# Patient Record
Sex: FEMALE | Race: WHITE | NOT HISPANIC OR LATINO | Employment: UNEMPLOYED | URBAN - METROPOLITAN AREA
[De-identification: names, ages, dates, MRNs, and addresses within clinical notes are randomized per-mention and may not be internally consistent; named-entity substitution may affect disease eponyms.]

---

## 2017-02-07 ENCOUNTER — TELEPHONE (OUTPATIENT)
Dept: ENDOCRINOLOGY | Facility: CLINIC | Age: 42
End: 2017-02-07

## 2017-02-07 NOTE — TELEPHONE ENCOUNTER
----- Message from Mar Watkins MD sent at 2/6/2017  4:14 PM EST -----  When can she come in.    We are very full and will have to work her in.  If she cannot come in soon she will need to see her family MD for insulin refills.  Thanks, [facility]  ----- Message -----     From: Geeta Anderson     Sent: 2/6/2017   8:32 AM       To: Mar Watkins MD    Do you want to work back in?  ----- Message -----     From: Hilaria Sahni     Sent: 2/1/2017   3:21 PM       To: Geeta Anderson    THE PATIENT CANCELLED HER APPOINTMENT WITH DR WATKINS AND WILL NEED TO RESCHEDULE. PT CALL BACK NUMBER [phone]

## 2021-02-17 ENCOUNTER — OFFICE VISIT (OUTPATIENT)
Dept: ENDOCRINOLOGY | Facility: CLINIC | Age: 46
End: 2021-02-17

## 2021-02-17 VITALS
TEMPERATURE: 97.5 F | SYSTOLIC BLOOD PRESSURE: 146 MMHG | DIASTOLIC BLOOD PRESSURE: 82 MMHG | WEIGHT: 194 LBS | BODY MASS INDEX: 39.11 KG/M2 | HEART RATE: 80 BPM | HEIGHT: 59 IN

## 2021-02-17 DIAGNOSIS — E03.9 ACQUIRED HYPOTHYROIDISM: Primary | ICD-10-CM

## 2021-02-17 DIAGNOSIS — E10.9 TYPE 1 DIABETES MELLITUS WITHOUT COMPLICATION (HCC): ICD-10-CM

## 2021-02-17 PROBLEM — E55.9 VITAMIN D DEFICIENCY: Status: ACTIVE | Noted: 2021-02-17

## 2021-02-17 PROCEDURE — 99204 OFFICE O/P NEW MOD 45 MIN: CPT | Performed by: INTERNAL MEDICINE

## 2021-02-17 NOTE — ASSESSMENT & PLAN NOTE
Blood sugar and 90 day average sugar reviewed  Results for orders placed or performed in visit on 02/17/21   POC Glucose Fingerstick    Specimen: Blood   Result Value Ref Range    Glucose 162 (A) 70 - 130 mg/dL    Lot Number [id number]     Expiration Date 8-16-21    POC Glycosylated Hemoglobin (Hb A1C)    Specimen: Blood   Result Value Ref Range    Hemoglobin A1C 6.8 %    Lot Number [id number]     Expiration Date 11-24-22      Average sugar is 140 but she is having severe hypoglycemia at hs and seeing rise in blood sugar overnight  Will change nph to tresiba 18 u daily   Continue aspart 6 units ac tid   Asked her to put her dexcom back on while transitioning and discussed pump options available including closed loop options   F/u 8 weeks  Samples tresiba provided and email provided to have her update blood sugars every 5-7 days so that we can get insulin / tresiba adjusted

## 2021-02-17 NOTE — PROGRESS NOTES
Rosa Holliday 45 y.o.  CC: Establish Care and Diabetes (IDDM )    [place]: Establish Care and Diabetes (IDDM )    New patient last seen in office in 8/5/16   Known h/o IDDM and hypothyroid  Has been monitored in interim by Dr Toussaint (her PCP)  Blood sugar and 90 day average sugar reviewed  Results for orders placed or performed in visit on 02/17/21   POC Glucose Fingerstick    Specimen: Blood   Result Value Ref Range    Glucose 162 (A) 70 - 130 mg/dL    Lot Number [id number]     Expiration Date 8-16-21    POC Glycosylated Hemoglobin (Hb A1C)    Specimen: Blood   Result Value Ref Range    Hemoglobin A1C 6.8 %    Lot Number [id number]     Expiration Date 11-24-22      Average sugar discussed and is in good range  She is currently taking nph 22 u at hs and 2 u in am   She is using aspart 6 u ac tid  She states that she has to have a low sugar at hs and has been trying to bring sugar down to 40-50 prior to going to bed  If she does this then she finds that her fasting sugar is around 100-120  She has had updated eye exam  Recent lab work in the last 6 months via PCP - will try to get a copy of these   Reviewed low sugar as less than 80 and need to avoid severe low sugar   Discussed moving to a once daily basal insulin and she is agreeable  She has not been using dexcom but has this device and sensors     Allergies   Allergen Reactions   • Codeine Hives       Current Outpatient Medications:   •  acetone, urine, test strip, Ketostix In Vitro Strip; Patient Sig: Ketostix In Vitro Strip Check urine q am; 1; 2; 08-May-2015; Active, Disp: , Rfl:   •  insulin aspart (NovoLOG) 100 UNIT/ML injection, Inject 6 Units under the skin into the appropriate area as directed 3 (Three) Times a Day Before Meals. Inject SS 3 time a day with meals. 2 unit to 10 carbs - 4 - 6 units 3 times daily, Disp: , Rfl:   •  insulin degludec (Tresiba FlexTouch) 100 UNIT/ML solution pen-injector injection, Inject 18 Units under the skin into the  appropriate area as directed Daily., Disp: 30 mL, Rfl: 1  •  thyroid (ARMOUR THYROID) 60 MG PO tablet, Take 1 tablet by mouth Daily., Disp: 30 tablet, Rfl: 6  •  Vitamin D, Cholecalciferol, 1000 UNITS capsule, Take  by mouth., Disp: , Rfl:   Patient Active Problem List    Diagnosis   • Vitamin D deficiency [E55.9]   • Abnormal finding on thyroid function test [R94.6]   • Fatigue [R53.83]   • Hypothyroidism [E03.9]   • Type 1 diabetes mellitus (CMS/HCC) [E10.9]     Review of Systems   Constitutional: Negative for activity change, appetite change and unexpected weight change.   HENT: Negative for congestion and rhinorrhea.    Eyes: Negative for visual disturbance.   Respiratory: Negative for cough and shortness of breath.    Cardiovascular: Negative for palpitations and leg swelling.   Gastrointestinal: Negative for constipation, diarrhea and nausea.   Genitourinary: Negative for hematuria.   Musculoskeletal: Negative for arthralgias, back pain, gait problem, joint swelling and myalgias.   Skin: Negative for color change, rash and wound.   Allergic/Immunologic: Negative for environmental allergies, food allergies and immunocompromised state.   Neurological: Negative for dizziness, weakness and light-headedness.   Psychiatric/Behavioral: Negative for confusion, decreased concentration, dysphoric mood and sleep disturbance. The patient is not nervous/anxious.      Social History     Socioeconomic History   • Marital status: Single     Spouse name: Not on file   • Number of children: Not on file   • Years of education: Not on file   • Highest education level: Not on file   Tobacco Use   • Smoking status: Never Smoker   • Smokeless tobacco: Never Used   Substance and Sexual Activity   • Alcohol use: No   • Drug use: Defer   • Sexual activity: Defer     Family History   Problem Relation Age of Onset   • Thyroid disease Mother    • Hypertension Father    • Heart attack Father    • Obesity Father    • Stroke Maternal  "Grandmother    • Diabetes type II Other      /82   Pulse 80   Temp 97.5 °F (36.4 °C)   Ht 149.9 cm (59\")   Wt 88 kg (194 lb)   BMI 39.18 kg/m²   Physical Exam  Vitals signs and nursing note reviewed.   Constitutional:       Appearance: Normal appearance. She is well-developed.   HENT:      Head: Normocephalic and atraumatic.   Eyes:      General: Lids are normal.      Extraocular Movements: Extraocular movements intact.      Conjunctiva/sclera: Conjunctivae normal.      Pupils: Pupils are equal, round, and reactive to light.   Neck:      Musculoskeletal: Normal range of motion and neck supple.      Thyroid: No thyroid mass or thyromegaly.      Vascular: No carotid bruit.      Trachea: Trachea normal. No tracheal deviation.   Cardiovascular:      Rate and Rhythm: Normal rate and regular rhythm.      Pulses: Normal pulses.      Heart sounds: Normal heart sounds. No murmur. No friction rub. No gallop.    Pulmonary:      Effort: Pulmonary effort is normal. No respiratory distress.      Breath sounds: Normal breath sounds. No wheezing.   Musculoskeletal: Normal range of motion.         General: No deformity.   Lymphadenopathy:      Cervical: No cervical adenopathy.   Skin:     General: Skin is warm and dry.      Findings: No erythema or rash.      Nails: There is no clubbing.     Neurological:      General: No focal deficit present.      Mental Status: She is alert and oriented to person, place, and time.      Cranial Nerves: No cranial nerve deficit.      Deep Tendon Reflexes: Reflexes are normal and symmetric. Reflexes normal.   Psychiatric:         Speech: Speech normal.         Behavior: Behavior normal.         Thought Content: Thought content normal.         Judgment: Judgment normal.       Results for orders placed or performed in visit on 02/17/21   POC Glucose Fingerstick    Specimen: Blood   Result Value Ref Range    Glucose 162 (A) 70 - 130 mg/dL    Lot Number [id number]     Expiration Date 8-16-21  "   POC Glycosylated Hemoglobin (Hb A1C)    Specimen: Blood   Result Value Ref Range    Hemoglobin A1C 6.8 %    Lot Number [id number]     Expiration Date 11-24-22      Problems Addressed this Visit        Other    Type 1 diabetes mellitus (CMS/Former[facility])     Blood sugar and 90 day average sugar reviewed  Results for orders placed or performed in visit on 02/17/21   POC Glucose Fingerstick    Specimen: Blood   Result Value Ref Range    Glucose 162 (A) 70 - 130 mg/dL    Lot Number [id number]     Expiration Date 8-16-21    POC Glycosylated Hemoglobin (Hb A1C)    Specimen: Blood   Result Value Ref Range    Hemoglobin A1C 6.8 %    Lot Number [id number]     Expiration Date 11-24-22      Average sugar is 140 but she is having severe hypoglycemia at hs and seeing rise in blood sugar overnight  Will change nph to tresiba 18 u daily   Continue aspart 6 units ac tid   Asked her to put her dexcom back on while transitioning and discussed pump options available including closed loop options   F/u 8 weeks  Samples tresiba provided and email provided to have her update blood sugars every 5-7 days so that we can get insulin / tresiba adjusted            Hypothyroidism - Primary     Try to get a copy of updated lab work on armour 60 mg daily   Continue current supplementation            Diagnoses       Codes Comments    Acquired hypothyroidism    -  Primary ICD-10-CM: E03.9  ICD-9-CM: 244.9     Type 1 diabetes mellitus without complication (CMS/[facility])     ICD-10-CM: E10.9  ICD-9-CM: 250.01         Return in about 2 months (around 4/17/2021) for Recheck.    Mar Watkins MD  Signed Mar Watkins MD

## 2021-03-23 RX ORDER — INSULIN DEGLUDEC INJECTION 100 U/ML
18 INJECTION, SOLUTION SUBCUTANEOUS DAILY
Qty: 30 ML | Refills: 1
Start: 2021-03-23 | End: 2021-04-01 | Stop reason: SDUPTHER

## 2021-03-23 RX ORDER — INSULIN LISPRO 100 [IU]/ML
6 INJECTION, SOLUTION INTRAVENOUS; SUBCUTANEOUS
Qty: 30 ML | Refills: 1 | Status: SHIPPED | OUTPATIENT
Start: 2021-03-23 | End: 2021-03-26 | Stop reason: SDUPTHER

## 2021-03-26 RX ORDER — INSULIN LISPRO 100 [IU]/ML
6 INJECTION, SOLUTION INTRAVENOUS; SUBCUTANEOUS
Qty: 15 ML | Refills: 0 | Status: SHIPPED | OUTPATIENT
Start: 2021-03-26 | End: 2021-05-04

## 2021-03-26 NOTE — TELEPHONE ENCOUNTER
[facility] that was sent it.  They indicate the pt is allergic to humalog and wanted clarification before filling the prescription  Pt was contacted to verify and she states in the past the Humulin products caused her to have low blood sugars and seizures so therefore she does not want anything related to humulin, however she states she is willing to try humalog but only wants a 30 day rx sent to [facility] so she can try it

## 2021-04-01 RX ORDER — INSULIN DEGLUDEC INJECTION 100 U/ML
18 INJECTION, SOLUTION SUBCUTANEOUS DAILY
Qty: 30 ML | Refills: 1
Start: 2021-04-01 | End: 2021-04-01 | Stop reason: SDUPTHER

## 2021-04-01 RX ORDER — PROCHLORPERAZINE 25 MG/1
SUPPOSITORY RECTAL
COMMUNITY
Start: 2021-02-18 | End: 2021-05-04 | Stop reason: SDUPTHER

## 2021-04-01 RX ORDER — INSULIN DEGLUDEC INJECTION 100 U/ML
18 INJECTION, SOLUTION SUBCUTANEOUS DAILY
Qty: 30 ML | Refills: 1
Start: 2021-04-01 | End: 2021-04-05 | Stop reason: SDUPTHER

## 2021-04-01 RX ORDER — PROCHLORPERAZINE 25 MG/1
SUPPOSITORY RECTAL
COMMUNITY
Start: 2021-03-15 | End: 2021-05-04 | Stop reason: SDUPTHER

## 2021-04-05 RX ORDER — INSULIN DEGLUDEC INJECTION 100 U/ML
18 INJECTION, SOLUTION SUBCUTANEOUS DAILY
Qty: 30 ML | Refills: 1 | Status: SHIPPED | OUTPATIENT
Start: 2021-04-05 | End: 2022-07-20

## 2021-04-05 NOTE — TELEPHONE ENCOUNTER
"Looks like the rx was listed as \"no print\" so therefore did not go to pharmacy  Please resend  Pt was advised she can  samples to last her until rx is delivered and she voiced understanding  "

## 2021-04-05 NOTE — TELEPHONE ENCOUNTER
PT CALLED STATING THAT THE TRESIBA RX WE SENT IN TO [facility] WAS NOT RECEIVED. PLEASE RESEND. PT WAS UPSET THAT THIS WAS NOT TAKEN CARE OF. SHE STATED SHE HAS CALLED MULTIPLE TIMES FOR THIS REQUEST. SHE STATED SHE IS RUNNING LOW ON THE MED. I TOLD THE PT WE WOULD CHECK TO SEE IF WE HAVE SAMPLES IF SHE RUNS OUT. THANK YOU

## 2021-05-04 ENCOUNTER — OFFICE VISIT (OUTPATIENT)
Dept: ENDOCRINOLOGY | Facility: CLINIC | Age: 46
End: 2021-05-04

## 2021-05-04 ENCOUNTER — LAB (OUTPATIENT)
Dept: LAB | Facility: HOSPITAL | Age: 46
End: 2021-05-04

## 2021-05-04 VITALS
HEART RATE: 71 BPM | WEIGHT: 194.8 LBS | OXYGEN SATURATION: 99 % | HEIGHT: 59 IN | SYSTOLIC BLOOD PRESSURE: 118 MMHG | BODY MASS INDEX: 39.27 KG/M2 | DIASTOLIC BLOOD PRESSURE: 70 MMHG

## 2021-05-04 DIAGNOSIS — E03.9 ACQUIRED HYPOTHYROIDISM: ICD-10-CM

## 2021-05-04 DIAGNOSIS — E78.2 MIXED HYPERLIPIDEMIA: ICD-10-CM

## 2021-05-04 DIAGNOSIS — E10.9 TYPE 1 DIABETES MELLITUS WITHOUT COMPLICATION (HCC): Primary | ICD-10-CM

## 2021-05-04 LAB
25(OH)D3 SERPL-MCNC: 36.3 NG/ML
CHOLEST SERPL-MCNC: 200 MG/DL (ref 0–200)
EXPIRATION DATE: ABNORMAL
EXPIRATION DATE: NORMAL
GLUCOSE BLDC GLUCOMTR-MCNC: 169 MG/DL (ref 70–130)
HBA1C MFR BLD: 6.3 %
HDLC SERPL-MCNC: 77 MG/DL (ref 40–60)
LDLC SERPL CALC-MCNC: 110 MG/DL (ref 0–100)
LDLC/HDLC SERPL: 1.41 {RATIO}
Lab: ABNORMAL
Lab: NORMAL
T4 FREE SERPL-MCNC: 0.75 NG/DL (ref 0.93–1.7)
TRIGL SERPL-MCNC: 74 MG/DL (ref 0–150)
TSH SERPL DL<=0.05 MIU/L-ACNC: 2.21 UIU/ML (ref 0.27–4.2)
VLDLC SERPL-MCNC: 13 MG/DL (ref 5–40)

## 2021-05-04 PROCEDURE — 84443 ASSAY THYROID STIM HORMONE: CPT | Performed by: INTERNAL MEDICINE

## 2021-05-04 PROCEDURE — 80053 COMPREHEN METABOLIC PANEL: CPT | Performed by: INTERNAL MEDICINE

## 2021-05-04 PROCEDURE — 82570 ASSAY OF URINE CREATININE: CPT | Performed by: INTERNAL MEDICINE

## 2021-05-04 PROCEDURE — 80061 LIPID PANEL: CPT | Performed by: INTERNAL MEDICINE

## 2021-05-04 PROCEDURE — 99214 OFFICE O/P EST MOD 30 MIN: CPT | Performed by: INTERNAL MEDICINE

## 2021-05-04 PROCEDURE — 82043 UR ALBUMIN QUANTITATIVE: CPT | Performed by: INTERNAL MEDICINE

## 2021-05-04 PROCEDURE — 83036 HEMOGLOBIN GLYCOSYLATED A1C: CPT | Performed by: INTERNAL MEDICINE

## 2021-05-04 PROCEDURE — 84439 ASSAY OF FREE THYROXINE: CPT | Performed by: INTERNAL MEDICINE

## 2021-05-04 PROCEDURE — 82306 VITAMIN D 25 HYDROXY: CPT | Performed by: INTERNAL MEDICINE

## 2021-05-04 PROCEDURE — 95251 CONT GLUC MNTR ANALYSIS I&R: CPT | Performed by: INTERNAL MEDICINE

## 2021-05-04 RX ORDER — PROCHLORPERAZINE 25 MG/1
1 SUPPOSITORY RECTAL
Qty: 1 EACH | Refills: 3 | Status: SHIPPED | OUTPATIENT
Start: 2021-05-04 | End: 2021-11-29 | Stop reason: SDUPTHER

## 2021-05-04 RX ORDER — PROCHLORPERAZINE 25 MG/1
SUPPOSITORY RECTAL
Qty: 10 EACH | Refills: 3 | Status: SHIPPED | OUTPATIENT
Start: 2021-05-04 | End: 2021-08-31 | Stop reason: SDUPTHER

## 2021-05-04 RX ORDER — LEVOTHYROXINE AND LIOTHYRONINE 38; 9 UG/1; UG/1
60 TABLET ORAL DAILY
Qty: 90 TABLET | Refills: 1 | Status: SHIPPED | OUTPATIENT
Start: 2021-05-04 | End: 2021-11-03

## 2021-05-04 NOTE — PROGRESS NOTES
Rosa Holliday 46 y.o.  CC:Follow-up, Diabetes (Type I, eye exam one month ago [facility]), and Hypothyroidism      [place]: Follow-up, Diabetes (Type I, eye exam one month ago [facility]), and Hypothyroidism    Blood sugar and 90 day average sugar reviewed  Results for orders placed or performed in visit on 05/04/21   Comprehensive Metabolic Panel    Specimen: Blood   Result Value Ref Range    Glucose 150 (H) 65 - 99 mg/dL    BUN 11 6 - 20 mg/dL    Creatinine 0.87 0.57 - 1.00 mg/dL    Sodium 135 (L) 136 - 145 mmol/L    Potassium 4.1 3.5 - 5.2 mmol/L    Chloride 101 98 - 107 mmol/L    CO2 23.9 22.0 - 29.0 mmol/L    Calcium 9.5 8.6 - 10.5 mg/dL    Total Protein 7.2 6.0 - 8.5 g/dL    Albumin 4.50 3.50 - 5.20 g/dL    ALT (SGPT) 9 1 - 33 U/L    AST (SGOT) 11 1 - 32 U/L    Alkaline Phosphatase 59 39 - 117 U/L    Total Bilirubin 0.3 0.0 - 1.2 mg/dL    eGFR Non African Amer 70 >60 mL/min/1.73    Globulin 2.7 gm/dL    A/G Ratio 1.7 g/dL    BUN/Creatinine Ratio 12.6 7.0 - 25.0    Anion Gap 10.1 5.0 - 15.0 mmol/L   Microalbumin / Creatinine Urine Ratio - Urine, Clean Catch    Specimen: Urine, Clean Catch   Result Value Ref Range    Microalbumin/Creatinine Ratio      Creatinine, Urine 16.8 mg/dL    Microalbumin, Urine <1.2 mg/dL   Lipid Panel    Specimen: Blood   Result Value Ref Range    Total Cholesterol 200 0 - 200 mg/dL    Triglycerides 74 0 - 150 mg/dL    HDL Cholesterol 77 (H) 40 - 60 mg/dL    LDL Cholesterol  110 (H) 0 - 100 mg/dL    VLDL Cholesterol 13 5 - 40 mg/dL    LDL/HDL Ratio 1.41    TSH    Specimen: Blood   Result Value Ref Range    TSH 2.210 0.270 - 4.200 uIU/mL   T4, Free    Specimen: Blood   Result Value Ref Range    Free T4 0.75 (L) 0.93 - 1.70 ng/dL   Vitamin D 25 Hydroxy    Specimen: Blood   Result Value Ref Range    25 Hydroxy, Vitamin D 36.3 ng/ml   POC Glycosylated Hemoglobin (Hb A1C)    Specimen: Blood   Result Value Ref Range    Hemoglobin A1C 6.3 %    Lot Number  [id number]     Expiration Date 01/14/2023    POC Glucose, Blood    Specimen: Blood   Result Value Ref Range    Glucose 169 (A) 70 - 130 mg/dL    Lot Number [id number]     Expiration Date 12/15/2021      Higher sugars with menses  Discussed adjustment of short acting insulin for higher sugars assoc with increase in insulin resistance  She has low sugars when she adjusts tresiba  She is utd with eye exam  No neuropathy or callus   Ur alb neg   Energy fair - on armour daily   Downloaded dexcom sensor and reviewed 14 days of sensor data     Allergies   Allergen Reactions   • Codeine Hives   • Humulin [Insulin Nph Isophane & Regular] Other (See Comments)     Caused hypoglycemic unawareness with seizures       Current Outpatient Medications:   •  acetone, urine, test strip, Ketostix In Vitro Strip; Patient Sig: Ketostix In Vitro Strip Check urine q am; 1; 2; 08-May-2015; Active, Disp: , Rfl:   •  Continuous Blood Gluc Sensor (Dexcom G6 Sensor), Apply one sensor q 10 days, Disp: 10 each, Rfl: 3  •  Continuous Blood Gluc Transmit (Dexcom G6 Transmitter) misc, 1 each by Other route Every 3 (Three) Months., Disp: 1 each, Rfl: 3  •  insulin degludec (Tresiba FlexTouch) 100 UNIT/ML solution pen-injector injection, Inject 18 Units under the skin into the appropriate area as directed Daily., Disp: 30 mL, Rfl: 1  •  insulin lispro (HumaLOG) 100 UNIT/ML injection, Inject 6 Units under the skin into the appropriate area as directed 3 (Three) Times a Day Before Meals., Disp: 10 mL, Rfl: 3  •  Thyroid (ARMOUR THYROID) 60 MG PO tablet, Take 1 tablet by mouth Daily., Disp: 90 tablet, Rfl: 1  •  Vitamin D, Cholecalciferol, 1000 UNITS capsule, Take  by mouth., Disp: , Rfl:   Patient Active Problem List    Diagnosis    • Vitamin D deficiency [E55.9]    • Rosacea [L71.9]    • Arthropathy due to type 1 diabetes mellitus (CMS/HCC) [E10.618]    • Chest pain [R07.9]    • Hyperlipidemia [E78.5]    • Abnormal finding on thyroid function test  "[R94.6]    • Fatigue [R53.83]    • Hypothyroidism [E03.9]    • Type 1 diabetes mellitus (CMS/HCC) [E10.9]      Review of Systems   Constitutional: Negative for activity change, appetite change and unexpected weight change.   HENT: Negative for congestion and rhinorrhea.    Eyes: Negative for visual disturbance.   Respiratory: Negative for cough and shortness of breath.    Cardiovascular: Negative for palpitations and leg swelling.   Gastrointestinal: Negative for constipation, diarrhea and nausea.   Genitourinary: Negative for hematuria.   Musculoskeletal: Negative for arthralgias, back pain, gait problem, joint swelling and myalgias.   Skin: Negative for color change, rash and wound.   Allergic/Immunologic: Negative for environmental allergies, food allergies and immunocompromised state.   Neurological: Negative for dizziness, weakness and light-headedness.   Psychiatric/Behavioral: Negative for confusion, decreased concentration, dysphoric mood and sleep disturbance. The patient is not nervous/anxious.      Social History     Socioeconomic History   • Marital status: Single     Spouse name: Not on file   • Number of children: Not on file   • Years of education: Not on file   • Highest education level: Not on file   Tobacco Use   • Smoking status: Never Smoker   • Smokeless tobacco: Never Used   Substance and Sexual Activity   • Alcohol use: No   • Drug use: Defer   • Sexual activity: Defer     Family History   Problem Relation Age of Onset   • Thyroid disease Mother    • Hypertension Father    • Heart attack Father    • Obesity Father    • Stroke Maternal Grandmother    • Diabetes type II Other      /70   Pulse 71   Ht 149.9 cm (59\")   Wt 88.4 kg (194 lb 12.8 oz)   SpO2 99%   BMI 39.34 kg/m²   Physical Exam  Vitals and nursing note reviewed.   Constitutional:       Appearance: Normal appearance. She is well-developed.   HENT:      Head: Normocephalic and atraumatic.   Eyes:      General: Lids are " normal.      Extraocular Movements: Extraocular movements intact.      Conjunctiva/sclera: Conjunctivae normal.      Pupils: Pupils are equal, round, and reactive to light.   Neck:      Thyroid: No thyroid mass or thyromegaly.      Vascular: No carotid bruit.      Trachea: Trachea normal. No tracheal deviation.   Cardiovascular:      Rate and Rhythm: Normal rate and regular rhythm.      Heart sounds: Normal heart sounds. No murmur heard.   No friction rub. No gallop.    Pulmonary:      Effort: Pulmonary effort is normal. No respiratory distress.      Breath sounds: Normal breath sounds. No wheezing.   Musculoskeletal:         General: No deformity. Normal range of motion.      Cervical back: Normal range of motion and neck supple.   Lymphadenopathy:      Cervical: No cervical adenopathy.   Skin:     General: Skin is warm and dry.      Findings: No erythema or rash.      Nails: There is no clubbing.   Neurological:      Mental Status: She is alert and oriented to person, place, and time.      Cranial Nerves: No cranial nerve deficit.      Deep Tendon Reflexes: Reflexes are normal and symmetric. Reflexes normal.   Psychiatric:         Speech: Speech normal.         Behavior: Behavior normal.         Thought Content: Thought content normal.         Judgment: Judgment normal.       Results for orders placed or performed in visit on 05/04/21   Comprehensive Metabolic Panel    Specimen: Blood   Result Value Ref Range    Glucose 150 (H) 65 - 99 mg/dL    BUN 11 6 - 20 mg/dL    Creatinine 0.87 0.57 - 1.00 mg/dL    Sodium 135 (L) 136 - 145 mmol/L    Potassium 4.1 3.5 - 5.2 mmol/L    Chloride 101 98 - 107 mmol/L    CO2 23.9 22.0 - 29.0 mmol/L    Calcium 9.5 8.6 - 10.5 mg/dL    Total Protein 7.2 6.0 - 8.5 g/dL    Albumin 4.50 3.50 - 5.20 g/dL    ALT (SGPT) 9 1 - 33 U/L    AST (SGOT) 11 1 - 32 U/L    Alkaline Phosphatase 59 39 - 117 U/L    Total Bilirubin 0.3 0.0 - 1.2 mg/dL    eGFR Non African Amer 70 >60 mL/min/1.73     Globulin 2.7 gm/dL    A/G Ratio 1.7 g/dL    BUN/Creatinine Ratio 12.6 7.0 - 25.0    Anion Gap 10.1 5.0 - 15.0 mmol/L   Microalbumin / Creatinine Urine Ratio - Urine, Clean Catch    Specimen: Urine, Clean Catch   Result Value Ref Range    Microalbumin/Creatinine Ratio      Creatinine, Urine 16.8 mg/dL    Microalbumin, Urine <1.2 mg/dL   Lipid Panel    Specimen: Blood   Result Value Ref Range    Total Cholesterol 200 0 - 200 mg/dL    Triglycerides 74 0 - 150 mg/dL    HDL Cholesterol 77 (H) 40 - 60 mg/dL    LDL Cholesterol  110 (H) 0 - 100 mg/dL    VLDL Cholesterol 13 5 - 40 mg/dL    LDL/HDL Ratio 1.41    TSH    Specimen: Blood   Result Value Ref Range    TSH 2.210 0.270 - 4.200 uIU/mL   T4, Free    Specimen: Blood   Result Value Ref Range    Free T4 0.75 (L) 0.93 - 1.70 ng/dL   Vitamin D 25 Hydroxy    Specimen: Blood   Result Value Ref Range    25 Hydroxy, Vitamin D 36.3 ng/ml   POC Glycosylated Hemoglobin (Hb A1C)    Specimen: Blood   Result Value Ref Range    Hemoglobin A1C 6.3 %    Lot Number [id number]     Expiration Date 01/14/2023    POC Glucose, Blood    Specimen: Blood   Result Value Ref Range    Glucose 169 (A) 70 - 130 mg/dL    Lot Number [id number]     Expiration Date 12/15/2021      Diagnoses and all orders for this visit:    1. Type 1 diabetes mellitus without complication (CMS/[facility]) (Primary)  Assessment & Plan:  Blood sugar and 90 day average sugar reviewed  Results for orders placed or performed in visit on 05/04/21   POC Glycosylated Hemoglobin (Hb A1C)    Specimen: Blood   Result Value Ref Range    Hemoglobin A1C 6.3 %    Lot Number [id number]     Expiration Date 01/14/2023    POC Glucose, Blood    Specimen: Blood   Result Value Ref Range    Glucose 169 (A) 70 - 130 mg/dL    Lot Number [id number]     Expiration Date 12/15/2021      Average sugar is 130   Is utd with eye exam  No neuropathy   Downloaded sensor and reviewed  High sugar from 1 pm to 5 pm - otherwise sugars look good  Discussed more  aggressive coverage with lunch meal/correction for that time of day   Heel callus- foot care discussed   Ur alb due   Downloaded sensor data and discussed  Good control overall     Orders:  -     POC Glycosylated Hemoglobin (Hb A1C)  -     POC Glucose, Blood  -     Comprehensive Metabolic Panel  -     Microalbumin / Creatinine Urine Ratio - Urine, Clean Catch    2. Mixed hyperlipidemia  Assessment & Plan:  Check flp - on low fat diet     Orders:  -     Lipid Panel    3. Acquired hypothyroidism  Assessment & Plan:  Is taking armour 60 mg daily   Check tfts     Orders:  -     TSH  -     T4, Free  -     Vitamin D 25 Hydroxy    Other orders  -     Thyroid (ARMOUR THYROID) 60 MG PO tablet; Take 1 tablet by mouth Daily.  Dispense: 90 tablet; Refill: 1  -     Continuous Blood Gluc Sensor (Dexcom G6 Sensor); Apply one sensor q 10 days  Dispense: 10 each; Refill: 3  -     Continuous Blood Gluc Transmit (Dexcom G6 Transmitter) misc; 1 each by Other route Every 3 (Three) Months.  Dispense: 1 each; Refill: 3  Return in about 3 months (around 8/4/2021).    Mar Watkins MD  Signed Mar Watkins MD

## 2021-05-05 LAB
ALBUMIN SERPL-MCNC: 4.5 G/DL (ref 3.5–5.2)
ALBUMIN UR-MCNC: <1.2 MG/DL
ALBUMIN/GLOB SERPL: 1.7 G/DL
ALP SERPL-CCNC: 59 U/L (ref 39–117)
ALT SERPL W P-5'-P-CCNC: 9 U/L (ref 1–33)
ANION GAP SERPL CALCULATED.3IONS-SCNC: 10.1 MMOL/L (ref 5–15)
AST SERPL-CCNC: 11 U/L (ref 1–32)
BILIRUB SERPL-MCNC: 0.3 MG/DL (ref 0–1.2)
BUN SERPL-MCNC: 11 MG/DL (ref 6–20)
BUN/CREAT SERPL: 12.6 (ref 7–25)
CALCIUM SPEC-SCNC: 9.5 MG/DL (ref 8.6–10.5)
CHLORIDE SERPL-SCNC: 101 MMOL/L (ref 98–107)
CO2 SERPL-SCNC: 23.9 MMOL/L (ref 22–29)
CREAT SERPL-MCNC: 0.87 MG/DL (ref 0.57–1)
CREAT UR-MCNC: 16.8 MG/DL
GFR SERPL CREATININE-BSD FRML MDRD: 70 ML/MIN/1.73
GLOBULIN UR ELPH-MCNC: 2.7 GM/DL
GLUCOSE SERPL-MCNC: 150 MG/DL (ref 65–99)
MICROALBUMIN/CREAT UR: NORMAL MG/G{CREAT}
POTASSIUM SERPL-SCNC: 4.1 MMOL/L (ref 3.5–5.2)
PROT SERPL-MCNC: 7.2 G/DL (ref 6–8.5)
SODIUM SERPL-SCNC: 135 MMOL/L (ref 136–145)

## 2021-05-05 NOTE — ASSESSMENT & PLAN NOTE
Blood sugar and 90 day average sugar reviewed  Results for orders placed or performed in visit on 05/04/21   POC Glycosylated Hemoglobin (Hb A1C)    Specimen: Blood   Result Value Ref Range    Hemoglobin A1C 6.3 %    Lot Number [id number]     Expiration Date 01/14/2023    POC Glucose, Blood    Specimen: Blood   Result Value Ref Range    Glucose 169 (A) 70 - 130 mg/dL    Lot Number [id number]     Expiration Date 12/15/2021      Average sugar is 130   Is utd with eye exam  No neuropathy   Downloaded sensor and reviewed  High sugar from 1 pm to 5 pm - otherwise sugars look good  Discussed more aggressive coverage with lunch meal/correction for that time of day   Heel callus- foot care discussed   Ur alb due   Downloaded sensor data and discussed  Good control overall

## 2021-05-22 RX ORDER — INSULIN LISPRO 100 [IU]/ML
INJECTION, SOLUTION INTRAVENOUS; SUBCUTANEOUS
Qty: 30 ML | Refills: 1 | Status: SHIPPED | OUTPATIENT
Start: 2021-05-22 | End: 2021-06-02

## 2021-05-25 NOTE — TELEPHONE ENCOUNTER
[facility]ill [facility] for novolog  Last ov 5-4-21  Next ov 8-24-21  [facility] was [facility] on 5-22-21

## 2021-05-28 NOTE — TELEPHONE ENCOUNTER
PATIENT NEEDS REFILLS ON NOVOLOG VIALS. SHE STATES HER INSURANCE HAS APPROVED HER TO TAKE NOVOLOG VIALS. NEEDS TO GO TO [facility]

## 2021-06-02 ENCOUNTER — TELEPHONE (OUTPATIENT)
Dept: ENDOCRINOLOGY | Facility: CLINIC | Age: 46
End: 2021-06-02

## 2021-06-03 RX ORDER — INSULIN ASPART INJECTION 100 [IU]/ML
6 INJECTION, SOLUTION SUBCUTANEOUS
Qty: 15 ML | Refills: 0
Start: 2021-06-03 | End: 2022-01-18 | Stop reason: ALTCHOICE

## 2021-06-03 NOTE — TELEPHONE ENCOUNTER
VANNA  I have been emailing patient - she had requested novolog and [facility], we filled with formulary prompt to humalog   She does not feel well on humalog but [facility] filled generic humalog and will not allow a return  She does not have the resources to fill 2 x 90 day supplies  Cc is delay in action  Spoke to rep for novolog and they no longer sample this  Spoke to patient about faster acting insulin apidra and she is agreeable to try   5 pens provided for her to - same sig as novolog /humalog   She will let me know if this is an acceptable alternative and we will provide another 60 days if possible of samples  Thanks,   Pedro Watkins MD

## 2021-06-25 NOTE — TELEPHONE ENCOUNTER
Pt called needs a prescription for Novofine plus needle 32g x 4mm. Pt also needs needles for her Fiasp flextouch 100 unit/mL solution pen injector injection she was unsure what needles to use. Pt last seen 05/04/21. Pt next appt 08/24/21

## 2021-08-31 ENCOUNTER — OFFICE VISIT (OUTPATIENT)
Dept: ENDOCRINOLOGY | Facility: CLINIC | Age: 46
End: 2021-08-31

## 2021-08-31 ENCOUNTER — TELEPHONE (OUTPATIENT)
Dept: ENDOCRINOLOGY | Facility: CLINIC | Age: 46
End: 2021-08-31

## 2021-08-31 VITALS
SYSTOLIC BLOOD PRESSURE: 124 MMHG | WEIGHT: 189.4 LBS | HEIGHT: 59 IN | BODY MASS INDEX: 38.18 KG/M2 | HEART RATE: 76 BPM | DIASTOLIC BLOOD PRESSURE: 78 MMHG | OXYGEN SATURATION: 99 %

## 2021-08-31 DIAGNOSIS — E10.9 TYPE 1 DIABETES MELLITUS WITHOUT COMPLICATION (HCC): Primary | ICD-10-CM

## 2021-08-31 DIAGNOSIS — E03.9 ACQUIRED HYPOTHYROIDISM: ICD-10-CM

## 2021-08-31 DIAGNOSIS — E78.00 PURE HYPERCHOLESTEROLEMIA: ICD-10-CM

## 2021-08-31 LAB
EXPIRATION DATE: ABNORMAL
EXPIRATION DATE: NORMAL
GLUCOSE BLDC GLUCOMTR-MCNC: 166 MG/DL (ref 70–130)
HBA1C MFR BLD: 6.7 %
Lab: ABNORMAL
Lab: NORMAL

## 2021-08-31 PROCEDURE — 99214 OFFICE O/P EST MOD 30 MIN: CPT | Performed by: INTERNAL MEDICINE

## 2021-08-31 PROCEDURE — 83036 HEMOGLOBIN GLYCOSYLATED A1C: CPT | Performed by: INTERNAL MEDICINE

## 2021-08-31 PROCEDURE — 82947 ASSAY GLUCOSE BLOOD QUANT: CPT | Performed by: INTERNAL MEDICINE

## 2021-08-31 RX ORDER — PROCHLORPERAZINE 25 MG/1
SUPPOSITORY RECTAL
Qty: 3 EACH | Refills: 5 | Status: SHIPPED | OUTPATIENT
Start: 2021-08-31 | End: 2021-11-10 | Stop reason: SDUPTHER

## 2021-08-31 NOTE — ASSESSMENT & PLAN NOTE
Blood sugar and 90 day average sugar reviewed  Results for orders placed or performed in visit on 08/31/21   POC Glycosylated Hemoglobin (Hb A1C)    Specimen: Blood   Result Value Ref Range    Hemoglobin A1C 6.7 %    Lot Number [id number]     Expiration Date 05/11/2023    POC Glucose, Blood    Specimen: Blood   Result Value Ref Range    Glucose 166 (A) 70 - 130 mg/dL    Lot Number [id number]     Expiration Date 04/14/2022      Average sugar is good  Continue dosing and medication  30 day dexcom sent to pharmacy   Options for help with weight (bmi 38) discussed  Her mother is taking contrave- works well  rx sent to local pharmacy  Is utd with eye exam  No neuropathy or callus   Ur alb neg  F/u 3-4 months

## 2021-08-31 NOTE — PROGRESS NOTES
Rosa Holliday 46 y.o.  CC:Follow-up, Diabetes (TYpe I, eye exam 4/2021 [facility]), and Hypothyroidism      [place]: Follow-up, Diabetes (TYpe I, eye exam 4/2021 [facility]), and Hypothyroidism    Blood sugar and 90 day average sugar reviewed  Results for orders placed or performed in visit on 08/31/21   POC Glycosylated Hemoglobin (Hb A1C)    Specimen: Blood   Result Value Ref Range    Hemoglobin A1C 6.7 %    Lot Number [id number]     Expiration Date 05/11/2023    POC Glucose, Blood    Specimen: Blood   Result Value Ref Range    Glucose 166 (A) 70 - 130 mg/dL    Lot Number [id number]     Expiration Date 04/14/2022      Average sugar is 140   bp is good   Energy is good overall  Is using fiasp samples  Would like novolog prescription  Does not feel humalog works for her- prior issue with humulin products     Allergies   Allergen Reactions   • Codeine Hives   • Humalog [Insulin Lispro] Other (See Comments)     ineffective   • Humulin [Insulin Nph Isophane & Regular] Other (See Comments)     Caused hypoglycemic unawareness with seizures       Current Outpatient Medications:   •  acetone, urine, test strip, Ketostix In Vitro Strip; Patient Sig: Ketostix In Vitro Strip Check urine q am; 1; 2; 08-May-2015; Active, Disp: , Rfl:   •  Continuous Blood Gluc Sensor (Dexcom G6 Sensor), Apply one sensor q 10 days, Disp: 3 each, Rfl: 5  •  Continuous Blood Gluc Transmit (Dexcom G6 Transmitter) misc, 1 each by Other route Every 3 (Three) Months., Disp: 1 each, Rfl: 3  •  Insulin aspart (FIASP FLEXTOUCH) 100 UNIT/ML solution pen-injector injection pen, Inject 6 Units under the skin into the appropriate area as directed 3 (Three) Times a Day Before Meals., Disp: 15 mL, Rfl: 0  •  insulin degludec (Tresiba FlexTouch) 100 UNIT/ML solution pen-injector injection, Inject 18 Units under the skin into the appropriate area as directed Daily. (Patient taking differently: Inject 16 Units under the skin into the appropriate area as directed Daily.),  Disp: 30 mL, Rfl: 1  •  Insulin Pen Needle (NovoFine Plus) 32G X 4 MM misc, Use 4 per day to administer insulin, Disp: 200 each, Rfl: 5  •  naltrexone-bupropion ER (CONTRAVE) 8-90 MG tablet, Take 1 tablet by mouth 2 (Two) Times a Day., Disp: 60 tablet, Rfl: 3  •  NovoLOG 100 UNIT/ML injection, Inject 6 units TID with meals, Disp: 10 mL, Rfl: 5  •  Thyroid (ARMOUR THYROID) 60 MG PO tablet, Take 1 tablet by mouth Daily., Disp: 90 tablet, Rfl: 1  •  Vitamin D, Cholecalciferol, 1000 UNITS capsule, Take  by mouth., Disp: , Rfl:   Patient Active Problem List    Diagnosis    • Vitamin D deficiency [E55.9]    • Rosacea [L71.9]    • 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   HENT: Negative for congestion and rhinorrhea.    Eyes: Negative for visual disturbance.   Respiratory: Negative for cough and shortness of breath.    Cardiovascular: Negative for palpitations and leg swelling.   Gastrointestinal: Negative for constipation, diarrhea and nausea.   Genitourinary: Negative for hematuria.   Musculoskeletal: Negative for arthralgias, back pain, gait problem, joint swelling and myalgias.   Skin: Negative for color change, rash and wound.   Allergic/Immunologic: Negative for environmental allergies, food allergies and immunocompromised state.   Neurological: Negative for dizziness, weakness and light-headedness.   Psychiatric/Behavioral: Negative for confusion, decreased concentration, dysphoric mood and sleep disturbance. The patient is not nervous/anxious.      Social History     Socioeconomic History   • Marital status: Single     Spouse name: Not on file   • Number of children: Not on  "file   • Years of education: Not on file   • Highest education level: Not on file   Tobacco Use   • Smoking status: Never Smoker   • Smokeless tobacco: Never Used   Substance and Sexual Activity   • Alcohol use: No   • Drug use: Defer   • Sexual activity: Defer     Family History   Problem Relation Age of Onset   • Thyroid disease Mother    • Hypertension Father    • Heart attack Father    • Obesity Father    • Stroke Maternal Grandmother    • Diabetes type II Other      /78   Pulse 76   Ht 149.9 cm (59\")   Wt 85.9 kg (189 lb 6.4 oz)   SpO2 99%   BMI 38.25 kg/m²   Physical Exam  Vitals and nursing note reviewed.   Constitutional:       Appearance: Normal appearance. She is well-developed.   HENT:      Head: Normocephalic and atraumatic.   Eyes:      General: Lids are normal.      Extraocular Movements: Extraocular movements intact.      Conjunctiva/sclera: Conjunctivae normal.      Pupils: Pupils are equal, round, and reactive to light.   Neck:      Thyroid: No thyroid mass or thyromegaly.      Vascular: No carotid bruit.      Trachea: Trachea normal. No tracheal deviation.   Cardiovascular:      Rate and Rhythm: Normal rate and regular rhythm.      Pulses: Normal pulses.      Heart sounds: Normal heart sounds. No murmur heard.   No friction rub. No gallop.    Pulmonary:      Effort: Pulmonary effort is normal. No respiratory distress.      Breath sounds: Normal breath sounds. No wheezing.   Musculoskeletal:         General: No deformity. Normal range of motion.      Cervical back: Normal range of motion and neck supple.   Feet:      Comments: Diabetic foot exam:   Left: Filament test present   Pulses Dorsalis Pedis:  present   Reflexes 2+    Vibratory sensation normal   Proprioception normal   Sharp/dull discrimination normal       Right: Filament test present   Pulses Dorsalis Pedis:  present   Reflexes 2+    Vibratory sensation normal   Proprioception normal   Sharp/dull discrimination normal  Diabetic " Foot Exam Performed and Monofilament Test Performed    Lymphadenopathy:      Cervical: No cervical adenopathy.   Skin:     General: Skin is warm and dry.      Findings: No erythema or rash.      Nails: There is no clubbing.   Neurological:      General: No focal deficit present.      Mental Status: She is alert and oriented to person, place, and time.      Cranial Nerves: No cranial nerve deficit.      Deep Tendon Reflexes: Reflexes are normal and symmetric. Reflexes normal.   Psychiatric:         Speech: Speech normal.         Behavior: Behavior normal.         Thought Content: Thought content normal.         Judgment: Judgment normal.       Results for orders placed or performed in visit on 08/31/21   POC Glycosylated Hemoglobin (Hb A1C)    Specimen: Blood   Result Value Ref Range    Hemoglobin A1C 6.7 %    Lot Number [id number]     Expiration Date 05/11/2023    POC Glucose, Blood    Specimen: Blood   Result Value Ref Range    Glucose 166 (A) 70 - 130 mg/dL    Lot Number [id number]     Expiration Date 04/14/2022      Diagnoses and all orders for this visit:    1. Type 1 diabetes mellitus without complication (CMS/[facility]) (Primary)  Assessment & Plan:  Blood sugar and 90 day average sugar reviewed  Results for orders placed or performed in visit on 08/31/21   POC Glycosylated Hemoglobin (Hb A1C)    Specimen: Blood   Result Value Ref Range    Hemoglobin A1C 6.7 %    Lot Number [id number]     Expiration Date 05/11/2023    POC Glucose, Blood    Specimen: Blood   Result Value Ref Range    Glucose 166 (A) 70 - 130 mg/dL    Lot Number [id number]     Expiration Date 04/14/2022      Average sugar is good  Continue dosing and medication  30 day dexcom sent to pharmacy   Options for help with weight (bmi 38) discussed  Her mother is taking contrave- works well  rx sent to local pharmacy  Is utd with eye exam  No neuropathy or callus   Ur alb neg  F/u 3-4 months     Orders:  -     POC Glycosylated Hemoglobin (Hb A1C)  -     POC  Glucose, Blood    2. Acquired hypothyroidism  Assessment & Plan:  Recent normal tsh  Continue armour 60 mg daily   Results reviewed       3. Pure hypercholesterolemia  Assessment & Plan:  High ldl 110, good hdl 77  Continue diet  Consider statin- discussed with patient       Other orders  -     NovoLOG 100 UNIT/ML injection; Inject 6 units TID with meals  Dispense: 10 mL; Refill: 5  -     Continuous Blood Gluc Sensor (Dexcom G6 Sensor); Apply one sensor q 10 days  Dispense: 3 each; Refill: 5  -     naltrexone-bupropion ER (CONTRAVE) 8-90 MG tablet; Take 1 tablet by mouth 2 (Two) Times a Day.  Dispense: 60 tablet; Refill: 3  Return in about 3 months (around 11/30/2021) for Recheck.    Mar Watkins MD  Signed Mar Watkins MD

## 2021-09-02 NOTE — TELEPHONE ENCOUNTER
Got message from patient stating contrave was covered  Have we gotten a denial for this medication?  Thanks,   Pedro

## 2021-09-02 NOTE — TELEPHONE ENCOUNTER
Denial was received from [facility] for contrrenetta  VO from Dr Watkins to try to get the PA approved  PA will be sent as time permits

## 2021-09-10 ENCOUNTER — PRIOR AUTHORIZATION (OUTPATIENT)
Dept: ENDOCRINOLOGY | Facility: CLINIC | Age: 46
End: 2021-09-10

## 2021-09-10 NOTE — TELEPHONE ENCOUNTER
PA was sent and approved for Contrave ER 8-90mg tablets from 8-11-21 - 1-8-2022 per [facility]  LMOM to advise pt

## 2021-11-03 RX ORDER — LEVOTHYROXINE AND LIOTHYRONINE 38; 9 UG/1; UG/1
60 TABLET ORAL DAILY
Qty: 90 TABLET | Refills: 0 | Status: SHIPPED | OUTPATIENT
Start: 2021-11-03 | End: 2022-01-18 | Stop reason: SDUPTHER

## 2021-11-10 RX ORDER — PROCHLORPERAZINE 25 MG/1
SUPPOSITORY RECTAL
Qty: 3 EACH | Refills: 5 | Status: SHIPPED | OUTPATIENT
Start: 2021-11-10 | End: 2022-11-09 | Stop reason: SDUPTHER

## 2021-11-29 RX ORDER — PROCHLORPERAZINE 25 MG/1
1 SUPPOSITORY RECTAL
Qty: 1 EACH | Refills: 0 | Status: SHIPPED | OUTPATIENT
Start: 2021-11-29 | End: 2022-01-18 | Stop reason: SDUPTHER

## 2022-01-17 RX ORDER — INSULIN LISPRO 100 [IU]/ML
INJECTION, SOLUTION INTRAVENOUS; SUBCUTANEOUS
Qty: 30 ML | Refills: 1 | Status: SHIPPED | OUTPATIENT
Start: 2022-01-17 | End: 2022-01-18 | Stop reason: SINTOL

## 2022-01-17 NOTE — TELEPHONE ENCOUNTER
Notification received from [facility] requesting a change from novolog vial to  humalog vial  LOV 8/31/2021  NOV - not scheduled

## 2022-01-18 ENCOUNTER — OFFICE VISIT (OUTPATIENT)
Dept: ENDOCRINOLOGY | Facility: CLINIC | Age: 47
End: 2022-01-18

## 2022-01-18 ENCOUNTER — LAB (OUTPATIENT)
Dept: LAB | Facility: HOSPITAL | Age: 47
End: 2022-01-18

## 2022-01-18 VITALS
DIASTOLIC BLOOD PRESSURE: 74 MMHG | HEART RATE: 91 BPM | SYSTOLIC BLOOD PRESSURE: 128 MMHG | HEIGHT: 59 IN | OXYGEN SATURATION: 98 % | WEIGHT: 179 LBS | BODY MASS INDEX: 36.08 KG/M2

## 2022-01-18 DIAGNOSIS — E03.9 ACQUIRED HYPOTHYROIDISM: ICD-10-CM

## 2022-01-18 DIAGNOSIS — E10.9 TYPE 1 DIABETES MELLITUS WITHOUT COMPLICATION: Primary | ICD-10-CM

## 2022-01-18 DIAGNOSIS — E78.00 PURE HYPERCHOLESTEROLEMIA: ICD-10-CM

## 2022-01-18 LAB
ALBUMIN SERPL-MCNC: 4.5 G/DL (ref 3.5–5.2)
ALBUMIN/GLOB SERPL: 1.7 G/DL
ALP SERPL-CCNC: 55 U/L (ref 39–117)
ALT SERPL W P-5'-P-CCNC: 8 U/L (ref 1–33)
ANION GAP SERPL CALCULATED.3IONS-SCNC: 10.2 MMOL/L (ref 5–15)
AST SERPL-CCNC: 14 U/L (ref 1–32)
BILIRUB SERPL-MCNC: 0.4 MG/DL (ref 0–1.2)
BUN SERPL-MCNC: 9 MG/DL (ref 6–20)
BUN/CREAT SERPL: 9.7 (ref 7–25)
CALCIUM SPEC-SCNC: 9.5 MG/DL (ref 8.6–10.5)
CHLORIDE SERPL-SCNC: 103 MMOL/L (ref 98–107)
CHOLEST SERPL-MCNC: 176 MG/DL (ref 0–200)
CO2 SERPL-SCNC: 25.8 MMOL/L (ref 22–29)
CREAT SERPL-MCNC: 0.93 MG/DL (ref 0.57–1)
EXPIRATION DATE: ABNORMAL
EXPIRATION DATE: NORMAL
GFR SERPL CREATININE-BSD FRML MDRD: 65 ML/MIN/1.73
GLOBULIN UR ELPH-MCNC: 2.6 GM/DL
GLUCOSE BLDC GLUCOMTR-MCNC: 151 MG/DL (ref 70–130)
GLUCOSE SERPL-MCNC: 150 MG/DL (ref 65–99)
HBA1C MFR BLD: 6.2 %
HDLC SERPL-MCNC: 60 MG/DL (ref 40–60)
LDLC SERPL CALC-MCNC: 106 MG/DL (ref 0–100)
LDLC/HDLC SERPL: 1.76 {RATIO}
Lab: ABNORMAL
Lab: NORMAL
POTASSIUM SERPL-SCNC: 3.9 MMOL/L (ref 3.5–5.2)
PROT SERPL-MCNC: 7.1 G/DL (ref 6–8.5)
SODIUM SERPL-SCNC: 139 MMOL/L (ref 136–145)
TRIGL SERPL-MCNC: 52 MG/DL (ref 0–150)
VLDLC SERPL-MCNC: 10 MG/DL (ref 5–40)

## 2022-01-18 PROCEDURE — 90686 IIV4 VACC NO PRSV 0.5 ML IM: CPT | Performed by: INTERNAL MEDICINE

## 2022-01-18 PROCEDURE — 83036 HEMOGLOBIN GLYCOSYLATED A1C: CPT | Performed by: INTERNAL MEDICINE

## 2022-01-18 PROCEDURE — 90471 IMMUNIZATION ADMIN: CPT | Performed by: INTERNAL MEDICINE

## 2022-01-18 PROCEDURE — 99214 OFFICE O/P EST MOD 30 MIN: CPT | Performed by: INTERNAL MEDICINE

## 2022-01-18 PROCEDURE — 84443 ASSAY THYROID STIM HORMONE: CPT | Performed by: INTERNAL MEDICINE

## 2022-01-18 PROCEDURE — 95251 CONT GLUC MNTR ANALYSIS I&R: CPT | Performed by: INTERNAL MEDICINE

## 2022-01-18 PROCEDURE — 80053 COMPREHEN METABOLIC PANEL: CPT | Performed by: INTERNAL MEDICINE

## 2022-01-18 PROCEDURE — 80061 LIPID PANEL: CPT | Performed by: INTERNAL MEDICINE

## 2022-01-18 RX ORDER — PROCHLORPERAZINE 25 MG/1
1 SUPPOSITORY RECTAL
Qty: 1 EACH | Refills: 3 | Status: SHIPPED | OUTPATIENT
Start: 2022-01-18

## 2022-01-18 RX ORDER — LEVOTHYROXINE AND LIOTHYRONINE 38; 9 UG/1; UG/1
60 TABLET ORAL DAILY
Qty: 90 TABLET | Refills: 1 | Status: SHIPPED | OUTPATIENT
Start: 2022-01-18 | End: 2022-07-20

## 2022-01-18 NOTE — PROGRESS NOTES
Rosa Miya 46 y.o.  CC:Follow-up, Diabetes (Type I, eye exam 4/21), Hypothyroidism, Hyperlipidemia, and Vitamin D Deficiency      [place]: Follow-up, Diabetes (Type I, eye exam 4/21), Hypothyroidism, Hyperlipidemia, and Vitamin D Deficiency    Blood sugar and 90 day average sugar reviewed  Results for orders placed or performed in visit on 01/18/22   POC Glycosylated Hemoglobin (Hb A1C)    Specimen: Blood   Result Value Ref Range    Hemoglobin A1C 6.2 %    Lot Number [id number]     Expiration Date 09/13/2023    POC Glucose, Blood    Specimen: Blood   Result Value Ref Range    Glucose 151 (A) 70 - 130 mg/dL    Lot Number [id number]     Expiration Date 07/21/2022      Average sugar is 120  bp is good   covid over holidays  Reviewed average sugar   Discussed dexcom glucose sensor download (13 days of sensor data discussed)  Higher after breakfast sugar  Some higher sugars assoc with PMS  Also having overnight low sugar- discussed reducing basal insulin to correct for this    Allergies   Allergen Reactions   • Codeine Hives   • Humalog [Insulin Lispro] Other (See Comments)     Causes hypoglycemic episodes very quickly   • Humulin [Insulin Nph Isophane & Regular] Other (See Comments)     Caused hypoglycemic unawareness with seizures       Current Outpatient Medications:   •  acetone, urine, test strip, Ketostix In Vitro Strip; Patient Sig: Ketostix In Vitro Strip Check urine q am; 1; 2; 08-May-2015; Active, Disp: , Rfl:   •  Continuous Blood Gluc Sensor (Dexcom G6 Sensor), Apply one sensor q 10 days, Disp: 3 each, Rfl: 5  •  Continuous Blood Gluc Transmit (Dexcom G6 Transmitter) misc, 1 each by Other route Every 3 (Three) Months., Disp: 1 each, Rfl: 3  •  insulin degludec (Tresiba FlexTouch) 100 UNIT/ML solution pen-injector injection, Inject 18 Units under the skin into the appropriate area as directed Daily. (Patient taking differently: Inject 16 Units under the skin into the appropriate area as directed Daily.), Disp:  30 mL, Rfl: 1  •  Insulin Pen Needle (NovoFine Plus) 32G X 4 MM misc, Use 4 per day to administer insulin, Disp: 200 each, Rfl: 5  •  naltrexone-bupropion ER (CONTRAVE) 8-90 MG tablet, Take 1 tablet by mouth 2 (Two) Times a Day., Disp: 60 tablet, Rfl: 3  •  NovoLOG 100 UNIT/ML injection, Inject 6 units TID with meals, Disp: 10 mL, Rfl: 5  •  Thyroid (ARMOUR THYROID) 60 MG PO tablet, Take 1 tablet by mouth Daily. 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   HENT: Negative for congestion and rhinorrhea.    Eyes: Negative for visual disturbance.   Respiratory: Negative for cough and shortness of breath.    Cardiovascular: Negative for palpitations and leg swelling.   Gastrointestinal: Negative for constipation, diarrhea and nausea.   Genitourinary: Negative for hematuria.   Musculoskeletal: Negative for arthralgias, back pain, gait problem, joint swelling and myalgias.   Skin: Negative for color change, rash and wound.   Allergic/Immunologic: Negative for environmental allergies, food allergies and immunocompromised state.   Neurological: Negative for dizziness, weakness and light-headedness.   Psychiatric/Behavioral: Negative for confusion, decreased concentration, dysphoric mood and sleep disturbance. The patient is not nervous/anxious.      Social History     Socioeconomic History   • Marital status: Single   Tobacco Use   • Smoking status: Never  "Smoker   • Smokeless tobacco: Never Used   Substance and Sexual Activity   • Alcohol use: No   • Drug use: Defer   • Sexual activity: Defer     Family History   Problem Relation Age of Onset   • Thyroid disease Mother    • Hypertension Father    • Heart attack Father    • Obesity Father    • Stroke Maternal Grandmother    • Diabetes type II Other      /74   Pulse 91   Ht 149.9 cm (59\")   Wt 81.2 kg (179 lb)   SpO2 98%   BMI 36.15 kg/m²   Physical Exam  Vitals and nursing note reviewed.   Constitutional:       Appearance: Normal appearance. She is well-developed.   HENT:      Head: Normocephalic and atraumatic.   Eyes:      General: Lids are normal.      Extraocular Movements: Extraocular movements intact.      Conjunctiva/sclera: Conjunctivae normal.      Pupils: Pupils are equal, round, and reactive to light.   Neck:      Thyroid: No thyroid mass or thyromegaly.      Vascular: No carotid bruit.      Trachea: Trachea normal. No tracheal deviation.   Cardiovascular:      Rate and Rhythm: Normal rate and regular rhythm.      Pulses: Normal pulses.      Heart sounds: Normal heart sounds. No murmur heard.  No friction rub. No gallop.    Pulmonary:      Effort: Pulmonary effort is normal. No respiratory distress.      Breath sounds: Normal breath sounds. No wheezing.   Musculoskeletal:         General: No deformity. Normal range of motion.      Cervical back: Normal range of motion and neck supple.   Lymphadenopathy:      Cervical: No cervical adenopathy.   Skin:     General: Skin is warm and dry.      Findings: No erythema or rash.      Nails: There is no clubbing.   Neurological:      Mental Status: She is alert and oriented to person, place, and time.      Cranial Nerves: No cranial nerve deficit.      Deep Tendon Reflexes: Reflexes are normal and symmetric. Reflexes normal.   Psychiatric:         Speech: Speech normal.         Behavior: Behavior normal.         Thought Content: Thought content normal.    "      Judgment: Judgment normal.       Results for orders placed or performed in visit on 01/18/22   POC Glycosylated Hemoglobin (Hb A1C)    Specimen: Blood   Result Value Ref Range    Hemoglobin A1C 6.2 %    Lot Number [id number]     Expiration Date 09/13/2023    POC Glucose, Blood    Specimen: Blood   Result Value Ref Range    Glucose 151 (A) 70 - 130 mg/dL    Lot Number [id number]     Expiration Date 07/21/2022      Diagnoses and all orders for this visit:    1. Type 1 diabetes mellitus without complication (HCC) (Primary)  Assessment & Plan:  Blood sugar and 90 day average sugar reviewed and are good  Results for orders placed or performed in visit on 01/18/22   POC Glycosylated Hemoglobin (Hb A1C)    Specimen: Blood   Result Value Ref Range    Hemoglobin A1C 6.2 %    Lot Number [id number]     Expiration Date 09/13/2023    POC Glucose, Blood    Specimen: Blood   Result Value Ref Range    Glucose 151 (A) 70 - 130 mg/dL    Lot Number [id number]     Expiration Date 07/21/2022      Discussed changes to be made in dosing for diet  Discussed 13 days of sensor data and recommended further reduction of tresiba to avoid overnight low   Adjustment of any evening correction discussed as well  Is utd with eye exam  No neuropathy or callus  Ur alb neg  F/u 3-4 months     Orders:  -     POC Glycosylated Hemoglobin (Hb A1C)  -     POC Glucose, Blood  -     Comprehensive Metabolic Panel    2. Pure hypercholesterolemia  Assessment & Plan:  Diet is low fat   Check flp   Add 30 min exercise daily as tolerated     Orders:  -     Lipid Panel    3. Acquired hypothyroidism  Assessment & Plan:  Is taking armour 60 mg daily   Check tfts     Orders:  -     TSH    Other orders  -     NovoLOG 100 UNIT/ML injection; Inject 6 units TID with meals  Dispense: 10 mL; Refill: 5  -     naltrexone-bupropion ER (CONTRAVE) 8-90 MG tablet; Take 1 tablet by mouth 2 (Two) Times a Day.  Dispense: 60 tablet; Refill: 3  -     Continuous Blood Gluc Transmit  (Dexcom G6 Transmitter) misc; 1 each by Other route Every 3 (Three) Months.  Dispense: 1 each; Refill: 3  -     Thyroid (ARMOUR THYROID) 60 MG PO tablet; Take 1 tablet by mouth Daily. APPT NEEDED PRIOR FURTHER REFILLS  Dispense: 90 tablet; Refill: 1    Mar Watkins MD  Signed Mar Watkins MD

## 2022-01-18 NOTE — ASSESSMENT & PLAN NOTE
Blood sugar and 90 day average sugar reviewed and are good  Results for orders placed or performed in visit on 01/18/22   POC Glycosylated Hemoglobin (Hb A1C)    Specimen: Blood   Result Value Ref Range    Hemoglobin A1C 6.2 %    Lot Number [id number]     Expiration Date 09/13/2023    POC Glucose, Blood    Specimen: Blood   Result Value Ref Range    Glucose 151 (A) 70 - 130 mg/dL    Lot Number [id number]     Expiration Date 07/21/2022      Discussed changes to be made in dosing for diet  Discussed 13 days of sensor data and recommended further reduction of tresiba to avoid overnight low   Adjustment of any evening correction discussed as well  Is utd with eye exam  No neuropathy or callus  Ur alb neg  F/u 3-4 months

## 2022-01-19 LAB — TSH SERPL DL<=0.05 MIU/L-ACNC: 0.93 UIU/ML (ref 0.27–4.2)

## 2022-01-31 RX ORDER — SEMAGLUTIDE 0.25 MG/.5ML
0.25 INJECTION, SOLUTION SUBCUTANEOUS WEEKLY
Qty: 2 ML | Refills: 0
Start: 2022-01-31 | End: 2022-02-25 | Stop reason: SINTOL

## 2022-02-22 RX ORDER — INSULIN LISPRO 100 [IU]/ML
INJECTION, SOLUTION INTRAVENOUS; SUBCUTANEOUS
Qty: 30 ML | Refills: 1 | Status: SHIPPED | OUTPATIENT
Start: 2022-02-22 | End: 2022-06-23 | Stop reason: SINTOL

## 2022-02-22 NOTE — TELEPHONE ENCOUNTER
[facility] a change from novolog to  humalog vials to save the pt money    LOV 01/18/2022  NOV04/28/2022

## 2022-02-25 ENCOUNTER — TELEPHONE (OUTPATIENT)
Dept: ENDOCRINOLOGY | Facility: CLINIC | Age: 47
End: 2022-02-25

## 2022-02-25 NOTE — TELEPHONE ENCOUNTER
Pt called states she was given samples of Wegovy 0.25 mg pt states she is vomiting and diarrhea pt states she is not losing any weight. Pt wants to know if you can call in a prescription for Contrave. ER 8-90 mg Pt will need a Prior Authorization last PA  on 22. Pt last seen 22 pt next appt 22

## 2022-03-03 ENCOUNTER — TELEPHONE (OUTPATIENT)
Dept: ENDOCRINOLOGY | Facility: CLINIC | Age: 47
End: 2022-03-03

## 2022-03-03 NOTE — TELEPHONE ENCOUNTER
PATIENT STATES THAT SHE IS HAVING ISSUES WITH ONE OF HER PRESCRIPTIONS AND WOULD LIKE TO SPEAK WITH CANDY.

## 2022-03-03 NOTE — TELEPHONE ENCOUNTER
Pt contacted Dr Watkins via email and Dr Watkins replied back to her the contrave rx was resent and this [facility] and we will have to await a denial before the PA can be sent

## 2022-03-04 ENCOUNTER — PRIOR AUTHORIZATION (OUTPATIENT)
Dept: ENDOCRINOLOGY | Facility: CLINIC | Age: 47
End: 2022-03-04

## 2022-06-23 ENCOUNTER — OFFICE VISIT (OUTPATIENT)
Dept: ENDOCRINOLOGY | Facility: CLINIC | Age: 47
End: 2022-06-23

## 2022-06-23 VITALS
OXYGEN SATURATION: 99 % | SYSTOLIC BLOOD PRESSURE: 132 MMHG | WEIGHT: 177.2 LBS | HEIGHT: 59 IN | HEART RATE: 62 BPM | DIASTOLIC BLOOD PRESSURE: 72 MMHG | BODY MASS INDEX: 35.72 KG/M2

## 2022-06-23 DIAGNOSIS — E78.00 PURE HYPERCHOLESTEROLEMIA: ICD-10-CM

## 2022-06-23 DIAGNOSIS — E03.9 ACQUIRED HYPOTHYROIDISM: ICD-10-CM

## 2022-06-23 DIAGNOSIS — E10.9 TYPE 1 DIABETES MELLITUS WITHOUT COMPLICATION: Primary | ICD-10-CM

## 2022-06-23 LAB
EXPIRATION DATE: ABNORMAL
EXPIRATION DATE: NORMAL
GLUCOSE BLDC GLUCOMTR-MCNC: 203 MG/DL (ref 70–130)
HBA1C MFR BLD: 6.7 %
Lab: ABNORMAL
Lab: NORMAL

## 2022-06-23 PROCEDURE — 82947 ASSAY GLUCOSE BLOOD QUANT: CPT | Performed by: INTERNAL MEDICINE

## 2022-06-23 PROCEDURE — 99214 OFFICE O/P EST MOD 30 MIN: CPT | Performed by: INTERNAL MEDICINE

## 2022-06-23 PROCEDURE — 83036 HEMOGLOBIN GLYCOSYLATED A1C: CPT | Performed by: INTERNAL MEDICINE

## 2022-06-23 NOTE — PROGRESS NOTES
Rosa Vancene 47 y.o.  CC:Follow-up, Diabetes (Type I, eye exam 2/27/21), Hypothyroidism, Hyperlipidemia, and Vitamin D Deficiency      [place]: Follow-up, Diabetes (Type I, eye exam 2/27/21), Hypothyroidism, Hyperlipidemia, and Vitamin D Deficiency    Blood sugar and 90 day average sugar reviewed  Results for orders placed or performed in visit on 06/23/22   POC Glycosylated Hemoglobin (Hb A1C)    Specimen: Blood   Result Value Ref Range    Hemoglobin A1C 6.7 %    Lot Number [id number]     Expiration Date 03/01/2024    POC Glucose, Blood    Specimen: Blood   Result Value Ref Range    Glucose 203 (A) 70 - 130 mg/dL    Lot Number [id number]     Expiration Date 01/13/2023      She is not testing sugars- dexcom too expensive with current insurance  Is utd with eye exam  No neuropathy or callus   Ur alb neg  Is taking armour 60 mg daily   Is using tresiba and novolog    Allergies   Allergen Reactions   • Codeine Hives   • Humalog [Insulin Lispro] Other (See Comments)     Causes hypoglycemic episodes very quickly   • Humulin [Insulin Nph Isophane & Regular] Other (See Comments)     Caused hypoglycemic unawareness with seizures       Current Outpatient Medications:   •  acetone, urine, test strip, Ketostix In Vitro Strip; Patient Sig: Ketostix In Vitro Strip Check urine q am; 1; 2; 08-May-2015; Active, Disp: , Rfl:   •  Continuous Blood Gluc Sensor (Dexcom G6 Sensor), Apply one sensor q 10 days, Disp: 3 each, Rfl: 5  •  Continuous Blood Gluc Sensor (FreeStyle Lin 2 Sensor) misc, 1 Units Every 14 (Fourteen) Days., Disp: 2 each, Rfl: 4  •  Continuous Blood Gluc Transmit (Dexcom G6 Transmitter) misc, 1 each by Other route Every 3 (Three) Months., Disp: 1 each, Rfl: 3  •  insulin aspart (NovoLOG) 100 UNIT/ML injection, Inject 6 units TID before meals - discard vial after 30 days, Disp: 30 mL, Rfl: 1  •  insulin degludec (Tresiba FlexTouch) 100 UNIT/ML solution pen-injector injection, Inject 18 Units under the skin into the  appropriate area as directed Daily. (Patient taking differently: Inject 13 Units under the skin into the appropriate area as directed Daily.), Disp: 30 mL, Rfl: 1  •  Insulin Pen Needle (NovoFine Plus) 32G X 4 MM misc, Use 4 per day to administer insulin, Disp: 200 each, Rfl: 5  •  naltrexone-bupropion ER (CONTRAVE) 8-90 MG tablet, Take 1 tablet by mouth 2 (Two) Times a Day., Disp: 180 tablet, Rfl: 1  •  Thyroid (ARMOUR THYROID) 60 MG PO tablet, Take 1 tablet by mouth Daily. 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   HENT: Negative for congestion and rhinorrhea.    Eyes: Negative for visual disturbance.   Respiratory: Negative for cough and shortness of breath.    Cardiovascular: Negative for palpitations and leg swelling.   Gastrointestinal: Negative for constipation, diarrhea and nausea.   Genitourinary: Negative for hematuria.   Musculoskeletal: Negative for arthralgias, back pain, gait problem, joint swelling and myalgias.   Skin: Negative for color change, rash and wound.   Allergic/Immunologic: Negative for environmental allergies, food allergies and immunocompromised state.   Neurological: Negative for dizziness, weakness and light-headedness.   Psychiatric/Behavioral: Negative for confusion, decreased concentration, dysphoric mood and sleep disturbance. The patient is not nervous/anxious.      Social History     Socioeconomic History   •  "Marital status: Single   Tobacco Use   • Smoking status: Never Smoker   • Smokeless tobacco: Never Used   Substance and Sexual Activity   • Alcohol use: No   • Drug use: Defer   • Sexual activity: Defer     Family History   Problem Relation Age of Onset   • Thyroid disease Mother    • Hypertension Father    • Heart attack Father    • Obesity Father    • Stroke Maternal Grandmother    • Diabetes type II Other      /72   Pulse 62   Ht 149.9 cm (59\")   Wt 80.4 kg (177 lb 3.2 oz)   SpO2 99%   BMI 35.79 kg/m²   Physical Exam  Vitals and nursing note reviewed.   Constitutional:       Appearance: Normal appearance. She is well-developed.   HENT:      Head: Normocephalic and atraumatic.   Eyes:      General: Lids are normal.      Extraocular Movements: Extraocular movements intact.      Conjunctiva/sclera: Conjunctivae normal.      Pupils: Pupils are equal, round, and reactive to light.   Neck:      Thyroid: No thyroid mass or thyromegaly.      Vascular: No carotid bruit.      Trachea: Trachea normal. No tracheal deviation.   Cardiovascular:      Rate and Rhythm: Normal rate and regular rhythm.      Pulses: Normal pulses.      Heart sounds: Normal heart sounds. No murmur heard.    No friction rub. No gallop.   Pulmonary:      Effort: Pulmonary effort is normal. No respiratory distress.      Breath sounds: Normal breath sounds. No wheezing.   Musculoskeletal:         General: No deformity. Normal range of motion.      Cervical back: Normal range of motion and neck supple.   Lymphadenopathy:      Cervical: No cervical adenopathy.   Skin:     General: Skin is warm and dry.      Findings: No erythema or rash.      Nails: There is no clubbing.   Neurological:      General: No focal deficit present.      Mental Status: She is alert and oriented to person, place, and time.      Cranial Nerves: No cranial nerve deficit.      Deep Tendon Reflexes: Reflexes are normal and symmetric. Reflexes normal.   Psychiatric:         " Mood and Affect: Mood normal.         Speech: Speech normal.         Behavior: Behavior normal.         Thought Content: Thought content normal.         Judgment: Judgment normal.       Results for orders placed or performed in visit on 06/23/22   POC Glycosylated Hemoglobin (Hb A1C)    Specimen: Blood   Result Value Ref Range    Hemoglobin A1C 6.7 %    Lot Number [id number]     Expiration Date 03/01/2024    POC Glucose, Blood    Specimen: Blood   Result Value Ref Range    Glucose 203 (A) 70 - 130 mg/dL    Lot Number [id number]     Expiration Date 01/13/2023      Diagnoses and all orders for this visit:    1. Type 1 diabetes mellitus without complication (HCC) (Primary)  Assessment & Plan:  Blood sugar and 90 day average sugar reviewed  Results for orders placed or performed in visit on 06/23/22   POC Glycosylated Hemoglobin (Hb A1C)    Specimen: Blood   Result Value Ref Range    Hemoglobin A1C 6.7 %    Lot Number [id number]     Expiration Date 03/01/2024    POC Glucose, Blood    Specimen: Blood   Result Value Ref Range    Glucose 203 (A) 70 - 130 mg/dL    Lot Number [id number]     Expiration Date 01/13/2023      Average sugar is stable  Continue monitoring and medications   Continue tresiba and novolog   Lin 2 sensor sent (dexcom too expensive)  F/u 3-4 months     Orders:  -     POC Glycosylated Hemoglobin (Hb A1C)  -     POC Glucose, Blood  -     Comprehensive Metabolic Panel; Future  -     Lipid Panel; Future  -     TSH; Future  -     Microalbumin / Creatinine Urine Ratio - Urine, Clean Catch; Future  -     Vitamin D 25 Hydroxy; Future    2. Acquired hypothyroidism  Assessment & Plan:  Continue armour 60 mg daily   Check tfts       3. Pure hypercholesterolemia  Assessment & Plan:  Continue low fat diet  Order for updated lab work provided       Other orders  -     Continuous Blood Gluc Sensor (FreeStyle Lin 2 Sensor) misc; 1 Units Every 14 (Fourteen) Days.  Dispense: 2 each; Refill: 4  Return in about 3 months  (around 9/23/2022) for Recheck.    Mar Watkins MD  Signed Mar Watkins MD

## 2022-06-23 NOTE — ASSESSMENT & PLAN NOTE
Blood sugar and 90 day average sugar reviewed  Results for orders placed or performed in visit on 06/23/22   POC Glycosylated Hemoglobin (Hb A1C)    Specimen: Blood   Result Value Ref Range    Hemoglobin A1C 6.7 %    Lot Number [id number]     Expiration Date 03/01/2024    POC Glucose, Blood    Specimen: Blood   Result Value Ref Range    Glucose 203 (A) 70 - 130 mg/dL    Lot Number [id number]     Expiration Date 01/13/2023      Average sugar is stable  Continue monitoring and medications   Continue tresiba and novolog   Lin 2 sensor sent (dexcom too expensive)  F/u 3-4 months

## 2022-07-20 RX ORDER — INSULIN DEGLUDEC INJECTION 100 U/ML
13 INJECTION, SOLUTION SUBCUTANEOUS DAILY
Qty: 12 ML | Refills: 2 | Status: SHIPPED | OUTPATIENT
Start: 2022-07-20 | End: 2022-11-09 | Stop reason: SDUPTHER

## 2022-07-20 RX ORDER — THYROID 60 MG
TABLET ORAL
Qty: 90 TABLET | Refills: 1 | Status: SHIPPED | OUTPATIENT
Start: 2022-07-20 | End: 2022-10-28 | Stop reason: SDUPTHER

## 2022-08-08 ENCOUNTER — TELEPHONE (OUTPATIENT)
Dept: ENDOCRINOLOGY | Facility: CLINIC | Age: 47
End: 2022-08-08

## 2022-08-08 NOTE — TELEPHONE ENCOUNTER
I spoke with Joann reilly pharmacy tech with [facility] and she states they do have novolog in stock and will put a rush on the refill and it should be shipped out within the next 24 hours   Pt was advised with this information and also advised if she runs out she can obtain a short term supply from the local pharmacy  She voiced understanding

## 2022-08-08 NOTE — TELEPHONE ENCOUNTER
Pt called states her pharmacy is telling her Novolog no longer exist pt is running out of Insulin this has been going on for a week. Pt wants to know if we have any samples or is there another Insulin that can be called in. Pt last f/u 06/23/22 pt nect f/u 10/03/22

## 2022-08-12 ENCOUNTER — TELEPHONE (OUTPATIENT)
Dept: ENDOCRINOLOGY | Facility: CLINIC | Age: 47
End: 2022-08-12

## 2022-08-12 NOTE — TELEPHONE ENCOUNTER
PT WAS CALLING ABOUT HER [facility] SAYS IT NEEDS A PA AND THEY ARE CANCELING HER RX    PLEASE CALL PT ON Monday 8/15/22  [phone]

## 2022-08-15 ENCOUNTER — PRIOR AUTHORIZATION (OUTPATIENT)
Dept: ENDOCRINOLOGY | Facility: CLINIC | Age: 47
End: 2022-08-15

## 2022-08-15 NOTE — TELEPHONE ENCOUNTER
Pt was advised we received notice last Friday evening the PA for novolog was approved from 7-13-22 - 8-12-23 via [facility]  She was advised to call the pharmacy to make sure it is being processed and to call me back if needed

## 2022-10-28 RX ORDER — LEVOTHYROXINE AND LIOTHYRONINE 38; 9 UG/1; UG/1
60 TABLET ORAL DAILY
Qty: 90 TABLET | Refills: 1 | Status: SHIPPED | OUTPATIENT
Start: 2022-10-28

## 2022-10-28 NOTE — TELEPHONE ENCOUNTER
Pt called requesting a prescription for [place] Thyroid 60 mg. Pt last f/u 06/23/22 pt next f/u 11/09/22

## 2022-11-09 ENCOUNTER — OFFICE VISIT (OUTPATIENT)
Dept: ENDOCRINOLOGY | Facility: CLINIC | Age: 47
End: 2022-11-09

## 2022-11-09 VITALS
OXYGEN SATURATION: 98 % | HEART RATE: 71 BPM | BODY MASS INDEX: 34.88 KG/M2 | DIASTOLIC BLOOD PRESSURE: 80 MMHG | HEIGHT: 59 IN | SYSTOLIC BLOOD PRESSURE: 120 MMHG | WEIGHT: 173 LBS

## 2022-11-09 DIAGNOSIS — E03.9 ACQUIRED HYPOTHYROIDISM: ICD-10-CM

## 2022-11-09 DIAGNOSIS — E10.9 TYPE 1 DIABETES MELLITUS WITHOUT COMPLICATION: Primary | ICD-10-CM

## 2022-11-09 DIAGNOSIS — E78.00 PURE HYPERCHOLESTEROLEMIA: ICD-10-CM

## 2022-11-09 LAB
EXPIRATION DATE: ABNORMAL
EXPIRATION DATE: NORMAL
GLUCOSE BLDC GLUCOMTR-MCNC: 244 MG/DL (ref 70–130)
HBA1C MFR BLD: 7.4 %
Lab: ABNORMAL
Lab: NORMAL

## 2022-11-09 PROCEDURE — 99214 OFFICE O/P EST MOD 30 MIN: CPT | Performed by: INTERNAL MEDICINE

## 2022-11-09 PROCEDURE — 83036 HEMOGLOBIN GLYCOSYLATED A1C: CPT | Performed by: INTERNAL MEDICINE

## 2022-11-09 PROCEDURE — 82947 ASSAY GLUCOSE BLOOD QUANT: CPT | Performed by: INTERNAL MEDICINE

## 2022-11-09 RX ORDER — INSULIN ASPART 100 [IU]/ML
INJECTION, SOLUTION INTRAVENOUS; SUBCUTANEOUS
COMMUNITY
Start: 2022-08-15 | End: 2022-11-09 | Stop reason: SDUPTHER

## 2022-11-09 RX ORDER — INSULIN DEGLUDEC INJECTION 100 U/ML
13 INJECTION, SOLUTION SUBCUTANEOUS DAILY
Qty: 15 ML | Refills: 1 | Status: SHIPPED | OUTPATIENT
Start: 2022-11-09 | End: 2023-01-30

## 2022-11-09 RX ORDER — PROCHLORPERAZINE 25 MG/1
SUPPOSITORY RECTAL
Qty: 9 EACH | Refills: 1 | Status: SHIPPED | OUTPATIENT
Start: 2022-11-09

## 2022-11-09 RX ORDER — INSULIN ASPART 100 [IU]/ML
10 INJECTION, SOLUTION INTRAVENOUS; SUBCUTANEOUS
Qty: 30 ML | Refills: 1 | Status: SHIPPED | OUTPATIENT
Start: 2022-11-09 | End: 2022-11-23 | Stop reason: SDUPTHER

## 2022-11-09 NOTE — PROGRESS NOTES
Rosacarlos Holliday 47 y.o.  CC: Diabetes and Hypothyroidism      [place]: Diabetes and Hypothyroidism    Blood sugar and 90 day average sugar reviewed  Results for orders placed or performed in visit on 11/09/22   POC Glycosylated Hemoglobin (Hb A1C)    Specimen: Blood   Result Value Ref Range    Hemoglobin A1C 7.4 %    Lot Number [id number]     Expiration Date 07/04/2024    POC Glucose, Blood    Specimen: Blood   Result Value Ref Range    Glucose 244 (A) 70 - 130 mg/dL    Lot Number [id number]     Expiration Date 5/24/2023      Average sugar is 165  a1c is 7.4%  Changed job- much more stressful- she is tearful discussing   bp is good  Is taking armour 60 mg daily   Is taking vitamin D supplement  Recent lab work 8/22 - reviewed  Using contrave for help with stress    Allergies   Allergen Reactions   • Codeine Hives   • Humalog [Insulin Lispro] Other (See Comments)     Causes hypoglycemic episodes very quickly   • Humulin [Insulin Nph Isophane & Regular] Other (See Comments)     Caused hypoglycemic unawareness with seizures       Current Outpatient Medications:   •  acetone, urine, test strip, Ketostix In Vitro Strip; Patient Sig: Ketostix In Vitro Strip Check urine q am; 1; 2; 08-May-2015; Active, Disp: , Rfl:   •  Continuous Blood Gluc Sensor (Dexcom G6 Sensor), Apply one sensor q 10 days, Disp: 9 each, Rfl: 1  •  Continuous Blood Gluc Transmit (Dexcom G6 Transmitter) misc, 1 each by Other route Every 3 (Three) Months., Disp: 1 each, Rfl: 3  •  insulin degludec (Tresiba FlexTouch) 100 UNIT/ML solution pen-injector injection, Inject 13 Units under the skin into the appropriate area as directed Daily., Disp: 15 mL, Rfl: 1  •  Insulin Pen Needle (NovoFine Plus) 32G X 4 MM misc, Use 4 per day to administer insulin, Disp: 200 each, Rfl: 5  •  naltrexone-bupropion ER (CONTRAVE) 8-90 MG tablet, Take 1 tablet by mouth 2 (Two) Times a Day., Disp: 180 tablet, Rfl: 1  •  NovoLOG 100 UNIT/ML injection, Inject 10 Units under the skin  into the appropriate area as directed 3 (Three) Times a Day Before Meals., Disp: 30 mL, Rfl: 1  •  Thyroid (ARMOUR THYROID) 60 MG PO tablet, Take 1 tablet by mouth Daily., Disp: 90 tablet, Rfl: 1  •  Vitamin D, Cholecalciferol, 1000 UNITS capsule, Take  by mouth., Disp: , Rfl:   Patient Active Problem List    Diagnosis    • Vitamin D deficiency [E55.9]    • Rosacea [L71.9]    • 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 Negative for appetite change and unexpected weight change.   HENT: Negative for congestion and rhinorrhea.    Eyes: Negative for visual disturbance.   Respiratory: Negative for cough and shortness of breath.    Cardiovascular: Negative for palpitations and leg swelling.   Gastrointestinal: Negative for constipation, diarrhea and nausea.   Genitourinary: Negative for hematuria.   Musculoskeletal: Negative for arthralgias, back pain, gait problem, joint swelling and myalgias.   Skin: Negative for color change, rash and wound.   Allergic/Immunologic: Negative for environmental allergies, food allergies and immunocompromised state.   Neurological: Negative for dizziness, weakness and light-headedness.   Psychiatric/Behavioral: Negative for confusion, decreased concentration, dysphoric mood and sleep disturbance. The patient is nervous/anxious.      Social History     Socioeconomic History   • Marital status: Single   Tobacco Use   • Smoking status: Never   • Smokeless tobacco: Never   Substance and Sexual Activity   • Alcohol use: No   • Drug use: Defer   • Sexual activity: Defer     Family History   Problem Relation Age of Onset   • Thyroid disease Mother    • Hypertension Father    • Heart attack Father    • Obesity  "Father    • Stroke Maternal Grandmother    • Diabetes type II Other      /80   Pulse 71   Ht 149.9 cm (59\")   Wt 78.5 kg (173 lb)   LMP 10/24/2022 (Approximate)   SpO2 98%   BMI 34.94 kg/m²   Physical Exam  Vitals and nursing note reviewed.   Constitutional:       Appearance: Normal appearance. She is well-developed.   HENT:      Head: Normocephalic and atraumatic.   Eyes:      General: Lids are normal.      Extraocular Movements: Extraocular movements intact.      Conjunctiva/sclera: Conjunctivae normal.      Pupils: Pupils are equal, round, and reactive to light.   Neck:      Thyroid: No thyroid mass or thyromegaly.      Vascular: No carotid bruit.      Trachea: Trachea normal. No tracheal deviation.   Cardiovascular:      Rate and Rhythm: Normal rate and regular rhythm.      Pulses: Normal pulses.      Heart sounds: Normal heart sounds. No murmur heard.    No friction rub. No gallop.   Pulmonary:      Effort: Pulmonary effort is normal. No respiratory distress.      Breath sounds: Normal breath sounds. No wheezing.   Musculoskeletal:         General: No deformity. Normal range of motion.      Cervical back: Normal range of motion and neck supple.   Lymphadenopathy:      Cervical: No cervical adenopathy.   Skin:     General: Skin is warm and dry.      Findings: No erythema or rash.      Nails: There is no clubbing.   Neurological:      General: No focal deficit present.      Mental Status: She is alert and oriented to person, place, and time.      Cranial Nerves: No cranial nerve deficit.      Deep Tendon Reflexes: Reflexes are normal and symmetric. Reflexes normal.   Psychiatric:         Mood and Affect: Mood normal.         Speech: Speech normal.         Behavior: Behavior normal.         Thought Content: Thought content normal.         Judgment: Judgment normal.       Results for orders placed or performed in visit on 11/09/22   POC Glycosylated Hemoglobin (Hb A1C)    Specimen: Blood   Result Value " Ref Range    Hemoglobin A1C 7.4 %    Lot Number [id number]     Expiration Date 07/04/2024    POC Glucose, Blood    Specimen: Blood   Result Value Ref Range    Glucose 244 (A) 70 - 130 mg/dL    Lot Number [id number]     Expiration Date 5/24/2023      Diagnoses and all orders for this visit:    1. Type 1 diabetes mellitus without complication (HCC) (Primary)  Assessment & Plan:  Blood sugar and 90 day average sugar reviewed  Results for orders placed or performed in visit on 11/09/22   POC Glycosylated Hemoglobin (Hb A1C)    Specimen: Blood   Result Value Ref Range    Hemoglobin A1C 7.4 %    Lot Number [id number]     Expiration Date 07/04/2024    POC Glucose, Blood    Specimen: Blood   Result Value Ref Range    Glucose 244 (A) 70 - 130 mg/dL    Lot Number [id number]     Expiration Date 5/24/2023      a1c is higher as is current sugar  More stress due to work   Is taking tresiba 13 u daily and novolog before meals  Did not like carmella sensor- resume dexcom use   Goals for sugar discussed  Refill contrave  F/u 3-4 months     Orders:  -     POC Glycosylated Hemoglobin (Hb A1C)  -     POC Glucose, Blood    2. Pure hypercholesterolemia  Assessment & Plan:  Is eating low fat diet   ldl 123  Discuss statin therapy       3. Acquired hypothyroidism  Assessment & Plan:  Update tsh nov   Taking armour 60 mg daily       Other orders  -     NovoLOG 100 UNIT/ML injection; Inject 10 Units under the skin into the appropriate area as directed 3 (Three) Times a Day Before Meals.  Dispense: 30 mL; Refill: 1  -     insulin degludec (Tresiba FlexTouch) 100 UNIT/ML solution pen-injector injection; Inject 13 Units under the skin into the appropriate area as directed Daily.  Dispense: 15 mL; Refill: 1  -     Continuous Blood Gluc Sensor (Dexcom G6 Sensor); Apply one sensor q 10 days  Dispense: 9 each; Refill: 1  -     naltrexone-bupropion ER (CONTRAVE) 8-90 MG tablet; Take 1 tablet by mouth 2 (Two) Times a Day.  Dispense: 180 tablet; Refill:  1  Return in about 3 months (around 2/9/2023) for Recheck.    Mar Watkins MD  Signed Mar Watkins MD

## 2022-11-09 NOTE — ASSESSMENT & PLAN NOTE
Blood sugar and 90 day average sugar reviewed  Results for orders placed or performed in visit on 11/09/22   POC Glycosylated Hemoglobin (Hb A1C)    Specimen: Blood   Result Value Ref Range    Hemoglobin A1C 7.4 %    Lot Number [id number]     Expiration Date 07/04/2024    POC Glucose, Blood    Specimen: Blood   Result Value Ref Range    Glucose 244 (A) 70 - 130 mg/dL    Lot Number [id number]     Expiration Date 5/24/2023      a1c is higher as is current sugar  More stress due to work   Is taking tresiba 13 u daily and novolog before meals  Did not like carmella sensor- resume dexcom use   Goals for sugar discussed  Refill contrave  F/u 3-4 months

## 2022-11-11 ENCOUNTER — PRIOR AUTHORIZATION (OUTPATIENT)
Dept: ENDOCRINOLOGY | Facility: CLINIC | Age: 47
End: 2022-11-11

## 2022-11-11 NOTE — TELEPHONE ENCOUNTER
PA sent for contrave 8/90mg tablets via [facility] to [facility]    Will await response

## 2022-11-14 ENCOUNTER — PRIOR AUTHORIZATION (OUTPATIENT)
Dept: ENDOCRINOLOGY | Facility: CLINIC | Age: 47
End: 2022-11-14

## 2022-11-14 NOTE — TELEPHONE ENCOUNTER
Rosa Holliday Key: [id number] - PA Case ID: [id number] - Rx #: [id number] help? Call us at [phone]  Outcome  Approvedon November 11  This request has been approved. Please contact [employer] for additional information on the approval.  Drug  Contrave 8-90MG er tablets  Form  [facility] Electronic Prior Authorization Request Form (2017 [employer]

## 2022-11-18 ENCOUNTER — TELEPHONE (OUTPATIENT)
Dept: ENDOCRINOLOGY | Facility: CLINIC | Age: 47
End: 2022-11-18

## 2022-11-18 NOTE — TELEPHONE ENCOUNTER
Pt called states her Novolog 100 unit/mL injections needing a Prior Authorization pt is getting this filled at [facility]. There is PA in the system that was approved with [facility]. Pt last f/u 11/09/22 pt next f/u 02/27/23

## 2022-11-21 ENCOUNTER — TELEPHONE (OUTPATIENT)
Dept: ENDOCRINOLOGY | Facility: CLINIC | Age: 47
End: 2022-11-21

## 2022-11-21 NOTE — TELEPHONE ENCOUNTER
Pt advised since it is new insurance   Now is  of [place] and new PA is required  I have sent the PA via [facility]  Pt notified

## 2022-11-21 NOTE — TELEPHONE ENCOUNTER
Michelle from [facility] called. She Needs order for novolog faxed to her. The pharmacy denied patient's prescription. Due to needing authorization.       Please send to their fax.    Michelle call  [phone]    Fax:   [phone]

## 2022-11-22 NOTE — TELEPHONE ENCOUNTER
PA was approved for novolog insulin per BS of [facility] to advise the pt

## 2022-11-23 ENCOUNTER — TELEPHONE (OUTPATIENT)
Dept: ENDOCRINOLOGY | Facility: CLINIC | Age: 47
End: 2022-11-23

## 2022-11-23 RX ORDER — INSULIN ASPART 100 [IU]/ML
INJECTION, SOLUTION INTRAVENOUS; SUBCUTANEOUS
Qty: 15 ML | Refills: 5 | Status: SHIPPED | OUTPATIENT
Start: 2022-11-23 | End: 2023-03-13

## 2022-11-23 NOTE — TELEPHONE ENCOUNTER
Pt called and needs prescription for the novolog pens.    Send to [facility]:    Phone: [phone] Fax: [phone]      Call patient:    [phone]

## 2023-01-30 RX ORDER — INSULIN DEGLUDEC INJECTION 100 U/ML
INJECTION, SOLUTION SUBCUTANEOUS
Qty: 15 ML | Refills: 1 | Status: SHIPPED | OUTPATIENT
Start: 2023-01-30 | End: 2023-03-13 | Stop reason: SDUPTHER

## 2023-02-03 ENCOUNTER — PRIOR AUTHORIZATION (OUTPATIENT)
Dept: ENDOCRINOLOGY | Facility: CLINIC | Age: 48
End: 2023-02-03

## 2023-02-03 ENCOUNTER — TELEPHONE (OUTPATIENT)
Dept: ENDOCRINOLOGY | Facility: CLINIC | Age: 48
End: 2023-02-03

## 2023-02-03 NOTE — TELEPHONE ENCOUNTER
Patient called stated she is needing a prior auth for her novolog flexpen sent to [facility]. Please advise.

## 2023-02-03 NOTE — TELEPHONE ENCOUNTER
PA sent for novolog flexpens via [facility] to [facility]    Will await a response

## 2023-02-10 ENCOUNTER — TELEPHONE (OUTPATIENT)
Dept: ENDOCRINOLOGY | Facility: CLINIC | Age: 48
End: 2023-02-10

## 2023-02-10 NOTE — TELEPHONE ENCOUNTER
Called [facility] and spoke with Gladys  She states the PA request is still in the review department and they have not requested additional information at this point.  She states it can take up to 15 days for a determination

## 2023-02-10 NOTE — TELEPHONE ENCOUNTER
Patient called stated she spoke with [facility] about her PA for novolog flexpens and they told her we need to call them to answer a series of clinical questions. Please advise.     She gave a case # [id number]  Phone # [phone]

## 2023-02-17 NOTE — TELEPHONE ENCOUNTER
Spoke with Mckenzie about novolog PA and answered additional questions and PA was approved for novolog flexpens from 1-4-23 - 2-17-24 per [facility]

## 2023-03-13 ENCOUNTER — OFFICE VISIT (OUTPATIENT)
Dept: ENDOCRINOLOGY | Facility: CLINIC | Age: 48
End: 2023-03-13
Payer: COMMERCIAL

## 2023-03-13 VITALS
BODY MASS INDEX: 34.88 KG/M2 | HEIGHT: 59 IN | SYSTOLIC BLOOD PRESSURE: 118 MMHG | DIASTOLIC BLOOD PRESSURE: 64 MMHG | WEIGHT: 173 LBS | OXYGEN SATURATION: 100 % | HEART RATE: 62 BPM

## 2023-03-13 DIAGNOSIS — E03.9 ACQUIRED HYPOTHYROIDISM: ICD-10-CM

## 2023-03-13 DIAGNOSIS — E10.9 TYPE 1 DIABETES MELLITUS WITHOUT COMPLICATION: Primary | ICD-10-CM

## 2023-03-13 DIAGNOSIS — E55.9 VITAMIN D DEFICIENCY: ICD-10-CM

## 2023-03-13 DIAGNOSIS — E78.00 PURE HYPERCHOLESTEROLEMIA: ICD-10-CM

## 2023-03-13 LAB
EXPIRATION DATE: ABNORMAL
EXPIRATION DATE: NORMAL
GLUCOSE BLDC GLUCOMTR-MCNC: 51 MG/DL (ref 70–130)
HBA1C MFR BLD: 7.2 %
Lab: ABNORMAL
Lab: NORMAL

## 2023-03-13 PROCEDURE — 99214 OFFICE O/P EST MOD 30 MIN: CPT | Performed by: INTERNAL MEDICINE

## 2023-03-13 PROCEDURE — 83036 HEMOGLOBIN GLYCOSYLATED A1C: CPT | Performed by: INTERNAL MEDICINE

## 2023-03-13 PROCEDURE — 82947 ASSAY GLUCOSE BLOOD QUANT: CPT | Performed by: INTERNAL MEDICINE

## 2023-03-13 RX ORDER — INSULIN ASPART 100 [IU]/ML
INJECTION, SOLUTION INTRAVENOUS; SUBCUTANEOUS
Qty: 90 ML | Refills: 1 | Status: SHIPPED | OUTPATIENT
Start: 2023-03-13

## 2023-03-13 RX ORDER — INSULIN DEGLUDEC INJECTION 100 U/ML
INJECTION, SOLUTION SUBCUTANEOUS
Qty: 15 ML | Refills: 1 | Status: SHIPPED | OUTPATIENT
Start: 2023-03-13

## 2023-03-13 NOTE — PROGRESS NOTES
Rosa Holliday 48 y.o.  CC:Follow-up, Diabetes (Type I, eye exam November 2022 [facility] [address]), and Hypothyroidism    [place]: Follow-up, Diabetes (Type I, eye exam November 2022 [facility] [address]), and Hypothyroidism    Blood sugar and 90 day average sugar reviewed  Results for orders placed or performed in visit on 03/13/23   POC Glycosylated Hemoglobin (Hb A1C)    Specimen: Blood   Result Value Ref Range    Hemoglobin A1C 7.2 %    Lot Number [id number]     Expiration Date 11/21/2024    POC Glucose, Blood    Specimen: Blood   Result Value Ref Range    Glucose 51 (A) 70 - 130 mg/dL    Lot Number [id number]     Expiration Date 10/14/2023      Average sugar is 150   bp is good  Is on low aft diet and taking armour 60 mg daily  Is not using sensor - not covered    Allergies   Allergen Reactions   • Codeine Hives   • Humalog [Insulin Lispro] Other (See Comments)     Causes hypoglycemic episodes very quickly   • Humulin [Insulin Nph Isophane & Regular] Other (See Comments)     Caused hypoglycemic unawareness with seizures       Current Outpatient Medications:   •  acetone, urine, test strip, Ketostix In Vitro Strip; Patient Sig: Ketostix In Vitro Strip Check urine q am; 1; 2; 08-May-2015; Active, Disp: , Rfl:   •  Continuous Blood Gluc Sensor (Dexcom G6 Sensor), Apply one sensor q 10 days, Disp: 9 each, Rfl: 1  •  Continuous Blood Gluc Transmit (Dexcom G6 Transmitter) misc, 1 each by Other route Every 3 (Three) Months., Disp: 1 each, Rfl: 3  •  insulin aspart (NovoLOG FlexPen) 100 UNIT/ML solution pen-injector sc pen, Inject 10 Units under the skin into the appropriate area as directed 3 (Three) Times a Day Before Meals, Disp: 15 mL, Rfl: 5  •  Insulin Pen Needle (NovoFine Plus) 32G X 4 MM misc, Use 4 per day to administer insulin, Disp: 200 each, Rfl: 5  •  Thyroid (ARMOUR THYROID) 60 MG PO tablet, Take 1 tablet by mouth Daily., Disp: 90 tablet, Rfl: 1  •  Tresiba FlexTouch 100 UNIT/ML solution pen-injector  injection, ADMINISTER 13 UNITS UNDER THE SKIN DAILY AS DIRECTED, Disp: 15 mL, Rfl: 1  •  Vitamin D, Cholecalciferol, 1000 UNITS capsule, Take  by mouth., Disp: , Rfl:   Patient Active Problem List    Diagnosis    • Vitamin D deficiency [E55.9]    • Rosacea [L71.9]    • Arthropathy due to type 1 diabetes mellitus (HCC) [E10.618]    • Chest pain [R07.9]    • Hyperlipidemia [E78.5]    • Abnormal finding on thyroid function test [R94.6]    • Fatigue [R53.83]    • Hypothyroidism [E03.9]    • Type 1 diabetes mellitus (HCC) [E10.9]      Review of Systems   Constitutional: Negative for activity change, appetite change and unexpected weight change.   HENT: Negative for congestion and rhinorrhea.    Eyes: Negative for visual disturbance.   Respiratory: Negative for cough and shortness of breath.    Cardiovascular: Negative for palpitations and leg swelling.   Gastrointestinal: Negative for constipation, diarrhea and nausea.   Genitourinary: Negative for hematuria.   Musculoskeletal: Negative for arthralgias, back pain, gait problem, joint swelling and myalgias.   Skin: Negative for color change, rash and wound.   Allergic/Immunologic: Negative for environmental allergies, food allergies and immunocompromised state.   Neurological: Negative for dizziness, weakness and light-headedness.   Psychiatric/Behavioral: Positive for dysphoric mood (tearful). Negative for confusion, decreased concentration and sleep disturbance. The patient is not nervous/anxious.      Social History     Socioeconomic History   • Marital status: Single   Tobacco Use   • Smoking status: Never   • Smokeless tobacco: Never   Substance and Sexual Activity   • Alcohol use: No   • Drug use: Defer   • Sexual activity: Defer     Family History   Problem Relation Age of Onset   • Thyroid disease Mother    • Hypertension Father    • Heart attack Father    • Obesity Father    • Stroke Maternal Grandmother    • Diabetes type II Other      /64   Pulse 62    "Ht 149.9 cm (59\")   Wt 78.5 kg (173 lb)   SpO2 100%   BMI 34.94 kg/m²   Physical Exam  Vitals and nursing note reviewed.   Constitutional:       Appearance: Normal appearance. She is well-developed.   HENT:      Head: Normocephalic and atraumatic.   Eyes:      General: Lids are normal.      Extraocular Movements: Extraocular movements intact.      Conjunctiva/sclera: Conjunctivae normal.      Pupils: Pupils are equal, round, and reactive to light.   Neck:      Thyroid: No thyroid mass or thyromegaly.      Vascular: No carotid bruit.      Trachea: Trachea normal. No tracheal deviation.   Cardiovascular:      Rate and Rhythm: Normal rate and regular rhythm.      Pulses: Normal pulses.      Heart sounds: Normal heart sounds. No murmur heard.    No friction rub. No gallop.   Pulmonary:      Effort: Pulmonary effort is normal. No respiratory distress.      Breath sounds: Normal breath sounds. No wheezing.   Musculoskeletal:         General: No deformity. Normal range of motion.      Cervical back: Normal range of motion and neck supple.   Lymphadenopathy:      Cervical: No cervical adenopathy.   Skin:     General: Skin is warm and dry.      Findings: No erythema or rash.      Nails: There is no clubbing.   Neurological:      General: No focal deficit present.      Mental Status: She is alert and oriented to person, place, and time.      Cranial Nerves: No cranial nerve deficit.      Deep Tendon Reflexes: Reflexes are normal and symmetric. Reflexes normal.   Psychiatric:         Mood and Affect: Mood normal.         Speech: Speech normal.         Behavior: Behavior normal.         Thought Content: Thought content normal.         Judgment: Judgment normal.       Results for orders placed or performed in visit on 03/13/23   POC Glycosylated Hemoglobin (Hb A1C)    Specimen: Blood   Result Value Ref Range    Hemoglobin A1C 7.2 %    Lot Number [id number]     Expiration Date 11/21/2024    POC Glucose, Blood    Specimen: " Blood   Result Value Ref Range    Glucose 51 (A) 70 - 130 mg/dL    Lot Number [id number]     Expiration Date 10/14/2023      Diagnoses and all orders for this visit:    1. Type 1 diabetes mellitus without complication (HCC) (Primary)  Assessment & Plan:  Blood sugar and 90 day average sugar reviewed  Results for orders placed or performed in visit on 03/13/23   POC Glycosylated Hemoglobin (Hb A1C)    Specimen: Blood   Result Value Ref Range    Hemoglobin A1C 7.2 %    Lot Number [id number]     Expiration Date 11/21/2024    POC Glucose, Blood    Specimen: Blood   Result Value Ref Range    Glucose 51 (A) 70 - 130 mg/dL    Lot Number [id number]     Expiration Date 10/14/2023      Average sugar is good  Current sugar is low  Is fasting- juice provided and recheck blood sugar tested prior to driving- was 91   Is utd with eye exam  No neuropathy or callus  Was using sensor but cannot afford  Ur alb due- order provided for patient     Orders:  -     POC Glycosylated Hemoglobin (Hb A1C)  -     POC Glucose, Blood  -     Comprehensive Metabolic Panel; Future  -     Microalbumin / Creatinine Urine Ratio - Urine, Clean Catch; Future    2. Pure hypercholesterolemia  Assessment & Plan:  Check flp - eating low fat diet       Orders:  -     Lipid Panel; Future    3. Acquired hypothyroidism  Assessment & Plan:  Taking armour 60 mg daily   Check tfts     Orders:  -     TSH; Future    4. Vitamin D deficiency  Assessment & Plan:  Continue supplement and update levels     Orders:  -     Vitamin D,25-Hydroxy; Future  Return in about 3 months (around 6/13/2023) for Recheck.  Consider adding sensor back when covered    Mar Watkins MD  Signed Mar Watkins MD

## 2023-03-13 NOTE — ASSESSMENT & PLAN NOTE
Blood sugar and 90 day average sugar reviewed  Results for orders placed or performed in visit on 03/13/23   POC Glycosylated Hemoglobin (Hb A1C)    Specimen: Blood   Result Value Ref Range    Hemoglobin A1C 7.2 %    Lot Number [id number]     Expiration Date 11/21/2024    POC Glucose, Blood    Specimen: Blood   Result Value Ref Range    Glucose 51 (A) 70 - 130 mg/dL    Lot Number [id number]     Expiration Date 10/14/2023      Average sugar is good  Current sugar is low  Is fasting- juice provided and recheck blood sugar tested prior to driving- was 91   Is utd with eye exam  No neuropathy or callus  Was using sensor but cannot afford  Ur alb due- order provided for patient

## 2023-03-14 LAB
25(OH)D3+25(OH)D2 SERPL-MCNC: 29.6 NG/ML (ref 30–100)
ALBUMIN SERPL-MCNC: 4.7 G/DL (ref 3.8–4.8)
ALBUMIN/GLOB SERPL: 1.8 {RATIO} (ref 1.2–2.2)
ALP SERPL-CCNC: 65 IU/L (ref 44–121)
ALT SERPL-CCNC: 6 IU/L (ref 0–32)
AMBIG ABBREV LP DEFAULT: NORMAL
AST SERPL-CCNC: 14 IU/L (ref 0–40)
BILIRUB SERPL-MCNC: 0.5 MG/DL (ref 0–1.2)
BUN SERPL-MCNC: 9 MG/DL (ref 6–24)
BUN/CREAT SERPL: 10 (ref 9–23)
CALCIUM SERPL-MCNC: 9.8 MG/DL (ref 8.7–10.2)
CHLORIDE SERPL-SCNC: 104 MMOL/L (ref 96–106)
CHOLEST SERPL-MCNC: 217 MG/DL (ref 100–199)
CO2 SERPL-SCNC: 25 MMOL/L (ref 20–29)
CREAT SERPL-MCNC: 0.88 MG/DL (ref 0.57–1)
CREAT UR-MCNC: 10.5 MG/DL
EGFRCR SERPLBLD CKD-EPI 2021: 82 ML/MIN/1.73
GLOBULIN SER CALC-MCNC: 2.6 G/DL (ref 1.5–4.5)
GLUCOSE SERPL-MCNC: 106 MG/DL (ref 70–99)
HDLC SERPL-MCNC: 76 MG/DL
LDLC SERPL CALC-MCNC: 129 MG/DL (ref 0–99)
POTASSIUM SERPL-SCNC: 4.6 MMOL/L (ref 3.5–5.2)
PROT SERPL-MCNC: 7.3 G/DL (ref 6–8.5)
PROT UR-MCNC: <4 MG/DL
PROT/CREAT UR: ABNORMAL MG/G CREAT (ref 0–200)
SODIUM SERPL-SCNC: 142 MMOL/L (ref 134–144)
TRIGL SERPL-MCNC: 70 MG/DL (ref 0–149)
TSH SERPL DL<=0.005 MIU/L-ACNC: 3.25 UIU/ML (ref 0.45–4.5)
VLDLC SERPL CALC-MCNC: 12 MG/DL (ref 5–40)

## 2023-03-14 RX ORDER — ATORVASTATIN CALCIUM 10 MG/1
10 TABLET, FILM COATED ORAL DAILY
Qty: 30 TABLET | Refills: 11 | Status: SHIPPED | OUTPATIENT
Start: 2023-03-14 | End: 2024-03-13

## 2023-04-27 RX ORDER — LEVOTHYROXINE, LIOTHYRONINE 38; 9 UG/1; UG/1
TABLET ORAL
Qty: 90 TABLET | Refills: 1 | Status: SHIPPED | OUTPATIENT
Start: 2023-04-27

## 2023-04-27 NOTE — TELEPHONE ENCOUNTER
Rx Refill Note  Requested Prescriptions     Pending Prescriptions Disp Refills   • NP Thyroid 60 MG tablet [Pharmacy Med Name: THYROID NP 1GR (60MG) TABLETS] 90 tablet 1     Sig: TAKE 1 TABLET BY MOUTH EVERY DAY      Last office visit with prescribing clinician: 3/13/2023     Next office visit with prescribing clinician: 6/28/2023

## 2024-02-14 RX ORDER — INSULIN DEGLUDEC INJECTION 100 U/ML
INJECTION, SOLUTION SUBCUTANEOUS
Qty: 15 ML | Refills: 1 | Status: SHIPPED | OUTPATIENT
Start: 2024-02-14

## 2024-02-20 ENCOUNTER — OFFICE VISIT (OUTPATIENT)
Dept: ENDOCRINOLOGY | Facility: CLINIC | Age: 49
End: 2024-02-20
Payer: COMMERCIAL

## 2024-02-20 VITALS
HEIGHT: 59 IN | SYSTOLIC BLOOD PRESSURE: 118 MMHG | DIASTOLIC BLOOD PRESSURE: 68 MMHG | OXYGEN SATURATION: 99 % | WEIGHT: 169.6 LBS | BODY MASS INDEX: 34.19 KG/M2 | HEART RATE: 64 BPM

## 2024-02-20 DIAGNOSIS — E78.00 PURE HYPERCHOLESTEROLEMIA: ICD-10-CM

## 2024-02-20 DIAGNOSIS — E55.9 VITAMIN D DEFICIENCY: ICD-10-CM

## 2024-02-20 DIAGNOSIS — E10.9 TYPE 1 DIABETES MELLITUS WITHOUT COMPLICATION: Primary | ICD-10-CM

## 2024-02-20 DIAGNOSIS — E03.9 ACQUIRED HYPOTHYROIDISM: ICD-10-CM

## 2024-02-20 LAB
25(OH)D3 SERPL-MCNC: 28.3 NG/ML (ref 30–100)
ALBUMIN SERPL-MCNC: 4.7 G/DL (ref 3.5–5.2)
ALBUMIN UR-MCNC: <1.2 MG/DL
ALBUMIN/GLOB SERPL: 1.7 G/DL
ALP SERPL-CCNC: 69 U/L (ref 39–117)
ALT SERPL W P-5'-P-CCNC: 12 U/L (ref 1–33)
ANION GAP SERPL CALCULATED.3IONS-SCNC: 9 MMOL/L (ref 5–15)
AST SERPL-CCNC: 15 U/L (ref 1–32)
BILIRUB SERPL-MCNC: 0.6 MG/DL (ref 0–1.2)
BUN SERPL-MCNC: 7 MG/DL (ref 6–20)
BUN/CREAT SERPL: 7.5 (ref 7–25)
CALCIUM SPEC-SCNC: 9.9 MG/DL (ref 8.6–10.5)
CHLORIDE SERPL-SCNC: 103 MMOL/L (ref 98–107)
CHOLEST SERPL-MCNC: 193 MG/DL (ref 0–200)
CO2 SERPL-SCNC: 25 MMOL/L (ref 22–29)
CREAT SERPL-MCNC: 0.93 MG/DL (ref 0.57–1)
CREAT UR-MCNC: 30 MG/DL
EGFRCR SERPLBLD CKD-EPI 2021: 76.4 ML/MIN/1.73
EXPIRATION DATE: ABNORMAL
EXPIRATION DATE: ABNORMAL
GLOBULIN UR ELPH-MCNC: 2.8 GM/DL
GLUCOSE BLDC GLUCOMTR-MCNC: 175 MG/DL (ref 70–130)
GLUCOSE SERPL-MCNC: 163 MG/DL (ref 65–99)
HBA1C MFR BLD: 7.9 % (ref 4.5–5.7)
HDLC SERPL-MCNC: 75 MG/DL (ref 40–60)
LDLC SERPL CALC-MCNC: 108 MG/DL (ref 0–100)
LDLC/HDLC SERPL: 1.43 {RATIO}
Lab: ABNORMAL
Lab: ABNORMAL
MICROALBUMIN/CREAT UR: NORMAL MG/G{CREAT}
POTASSIUM SERPL-SCNC: 4.2 MMOL/L (ref 3.5–5.2)
PROT SERPL-MCNC: 7.5 G/DL (ref 6–8.5)
SODIUM SERPL-SCNC: 137 MMOL/L (ref 136–145)
T4 FREE SERPL-MCNC: 0.93 NG/DL (ref 0.93–1.7)
TRIGL SERPL-MCNC: 53 MG/DL (ref 0–150)
TSH SERPL DL<=0.05 MIU/L-ACNC: 1.83 UIU/ML (ref 0.27–4.2)
VLDLC SERPL-MCNC: 10 MG/DL (ref 5–40)

## 2024-02-20 PROCEDURE — 82306 VITAMIN D 25 HYDROXY: CPT | Performed by: INTERNAL MEDICINE

## 2024-02-20 PROCEDURE — 84443 ASSAY THYROID STIM HORMONE: CPT | Performed by: INTERNAL MEDICINE

## 2024-02-20 PROCEDURE — 82043 UR ALBUMIN QUANTITATIVE: CPT | Performed by: INTERNAL MEDICINE

## 2024-02-20 PROCEDURE — 83036 HEMOGLOBIN GLYCOSYLATED A1C: CPT | Performed by: INTERNAL MEDICINE

## 2024-02-20 PROCEDURE — 84439 ASSAY OF FREE THYROXINE: CPT | Performed by: INTERNAL MEDICINE

## 2024-02-20 PROCEDURE — 82947 ASSAY GLUCOSE BLOOD QUANT: CPT | Performed by: INTERNAL MEDICINE

## 2024-02-20 PROCEDURE — 84255 ASSAY OF SELENIUM: CPT | Performed by: INTERNAL MEDICINE

## 2024-02-20 PROCEDURE — 82570 ASSAY OF URINE CREATININE: CPT | Performed by: INTERNAL MEDICINE

## 2024-02-20 PROCEDURE — 80053 COMPREHEN METABOLIC PANEL: CPT | Performed by: INTERNAL MEDICINE

## 2024-02-20 PROCEDURE — 99214 OFFICE O/P EST MOD 30 MIN: CPT | Performed by: INTERNAL MEDICINE

## 2024-02-20 PROCEDURE — 80061 LIPID PANEL: CPT | Performed by: INTERNAL MEDICINE

## 2024-02-20 RX ORDER — THYROID 60 MG/1
60 TABLET ORAL DAILY
Qty: 90 TABLET | Refills: 1 | Status: SHIPPED | OUTPATIENT
Start: 2024-02-20

## 2024-02-20 RX ORDER — THYROID 60 MG/1
60 TABLET ORAL DAILY
COMMUNITY
End: 2024-02-20 | Stop reason: SDUPTHER

## 2024-02-20 RX ORDER — SYRINGE-NEEDLE,INSULIN,0.5 ML 27GX1/2"
SYRINGE, EMPTY DISPOSABLE MISCELLANEOUS
Qty: 400 EACH | Refills: 1 | Status: SHIPPED | OUTPATIENT
Start: 2024-02-20

## 2024-02-20 RX ORDER — INSULIN DEGLUDEC 100 U/ML
INJECTION, SOLUTION SUBCUTANEOUS
Qty: 30 ML | Refills: 1 | Status: SHIPPED | OUTPATIENT
Start: 2024-02-20

## 2024-02-20 RX ORDER — ACYCLOVIR 400 MG/1
1 TABLET ORAL
Qty: 3 EACH | Refills: 5 | Status: SHIPPED | OUTPATIENT
Start: 2024-02-20

## 2024-02-20 NOTE — ASSESSMENT & PLAN NOTE
Blood sugar and 90 day average sugar reviewed  Results for orders placed or performed in visit on 02/20/24   POC Glycosylated Hemoglobin (Hb A1C)    Specimen: Blood   Result Value Ref Range    Hemoglobin A1C 7.9 (A) 4.5 - 5.7 %    Lot Number [id number]     Expiration Date 11/21/2025    POC Glucose, Blood    Specimen: Blood   Result Value Ref Range    Glucose 175 (A) 70 - 130 mg/dL    Lot Number [id number]     Expiration Date 08/24/2024      Higher average sugar - discussed using sensor to help keep sugars in goal   G7 sent and she will let us know if she cannot connect and use   Is utd with eye exam  No foot callus or ulcer  Ur alb neg  F/u 3-4 months

## 2024-02-20 NOTE — PROGRESS NOTES
Rosacarlos Holliday 48 y.o.  CC:Follow-up, Diabetes (Type I, eye exam 11/22), Hypothyroidism, and Hyperlipidemia      [place]: Follow-up, Diabetes (Type I, eye exam 11/22), Hypothyroidism, and Hyperlipidemia    Blood sugar and 90 day average sugar reviewed  Results for orders placed or performed in visit on 02/20/24   POC Glycosylated Hemoglobin (Hb A1C)    Specimen: Blood   Result Value Ref Range    Hemoglobin A1C 7.9 (A) 4.5 - 5.7 %    Lot Number [id number]     Expiration Date 11/21/2025    POC Glucose, Blood    Specimen: Blood   Result Value Ref Range    Glucose 175 (A) 70 - 130 mg/dL    Lot Number [id number]     Expiration Date 08/24/2024      Average sugar is 180  Bp is good   Interim hives, took steroids   Is not using sensor- reader was not covered  Discussed sending sensor- improved control with blood sugar knowledge discussed  She will let us know if G7 is not covered  Is taking thyroid 60 mg daily   Is taking lipitor 10 mg daily   Has stopped selenium and would like blood level   She is avoiding foods high in selenium   Discussed use of medication to help with hives and she is seeing a dermatologist  Is utd with eye exam  No foot callus or ulcer   Ur alb due     Allergies   Allergen Reactions    Codeine Hives    Humalog [Insulin Lispro] Other (See Comments)     Causes hypoglycemic episodes very quickly    Humulin [Insulin Nph Isophane & Regular] Other (See Comments)     Caused hypoglycemic unawareness with seizures       Current Outpatient Medications:     Thyroid 60 MG PO tablet, Take 1 tablet by mouth Daily., Disp: 90 tablet, Rfl: 1    atorvastatin (Lipitor) 10 MG tablet, Take 1 tablet by mouth Daily., Disp: 30 tablet, Rfl: 11    Continuous Blood Gluc Sensor (Dexcom G7 Sensor) misc, Use 1 Units Every 10 (Ten) Days., Disp: 3 each, Rfl: 5    Insulin Aspart (NovoLOG) 100 UNIT/ML injection, Inject 10U TID before meals (discard vial 30 days after opening), Disp: 90 mL, Rfl: 1    Insulin Degludec (Tresiba) 100 UNIT/ML  "solution injection, INject 13U subQ once per day, Disp: 30 mL, Rfl: 1    Insulin Syringe-Needle U-100 (BD Insulin Syringe U/F) 31G X 5/16\" 0.5 ML misc, Use 4 per day to administer insulin, Disp: 400 each, Rfl: 1    Vitamin D, Cholecalciferol, 1000 UNITS capsule, Take  by mouth., Disp: , Rfl:   Patient Active Problem List    Diagnosis     Vitamin D deficiency [E55.9]     Rosacea [L71.9]     Arthropathy due to type 1 diabetes mellitus [E10.618]     Chest pain [R07.9]     Hyperlipidemia [E78.5]     Abnormal finding on thyroid function test [R94.6]     Fatigue [R53.83]     Hypothyroidism [E03.9]     Type 1 diabetes mellitus [E10.9]      Review of Systems   Constitutional:  Negative for activity change, appetite change and unexpected weight change.   HENT:  Negative for congestion and rhinorrhea.    Eyes:  Negative for visual disturbance.   Respiratory:  Negative for cough and shortness of breath.    Cardiovascular:  Negative for palpitations and leg swelling.   Gastrointestinal:  Negative for constipation, diarrhea and nausea.   Genitourinary:  Negative for hematuria.   Musculoskeletal:  Negative for arthralgias, back pain, gait problem, joint swelling and myalgias.   Skin:  Positive for rash. Negative for color change and wound.   Allergic/Immunologic: Negative for environmental allergies, food allergies and immunocompromised state.   Neurological:  Negative for dizziness, weakness and light-headedness.   Psychiatric/Behavioral:  Negative for confusion, decreased concentration, dysphoric mood and sleep disturbance. The patient is not nervous/anxious.      Social History     Socioeconomic History    Marital status:    Tobacco Use    Smoking status: Never    Smokeless tobacco: Never   Substance and Sexual Activity    Alcohol use: No    Drug use: Defer    Sexual activity: Defer     Family History   Problem Relation Age of Onset    Thyroid disease Mother     Hypertension Father     Heart attack Father     Obesity " "Father     Stroke Maternal Grandmother     Diabetes type II Other      /68   Pulse 64   Ht 149.9 cm (59\")   Wt 76.9 kg (169 lb 9.6 oz)   SpO2 99%   BMI 34.26 kg/m²   Physical Exam  Vitals and nursing note reviewed.   Constitutional:       Appearance: Normal appearance. She is well-developed.   HENT:      Head: Normocephalic and atraumatic.   Eyes:      General: Lids are normal.      Extraocular Movements: Extraocular movements intact.      Conjunctiva/sclera: Conjunctivae normal.      Pupils: Pupils are equal, round, and reactive to light.   Neck:      Thyroid: No thyroid mass or thyromegaly.      Vascular: No carotid bruit.      Trachea: Trachea normal. No tracheal deviation.   Cardiovascular:      Rate and Rhythm: Normal rate and regular rhythm.      Pulses: Normal pulses.      Heart sounds: Normal heart sounds. No murmur heard.     No friction rub. No gallop.   Pulmonary:      Effort: Pulmonary effort is normal. No respiratory distress.      Breath sounds: Normal breath sounds. No wheezing.   Musculoskeletal:         General: No deformity. Normal range of motion.      Cervical back: Normal range of motion and neck supple.   Lymphadenopathy:      Cervical: No cervical adenopathy.   Skin:     General: Skin is warm and dry.      Findings: No erythema or rash.      Nails: There is no clubbing.   Neurological:      General: No focal deficit present.      Mental Status: She is alert and oriented to person, place, and time.      Cranial Nerves: No cranial nerve deficit.      Deep Tendon Reflexes: Reflexes are normal and symmetric. Reflexes normal.   Psychiatric:         Mood and Affect: Mood normal.         Speech: Speech normal.         Behavior: Behavior normal.         Thought Content: Thought content normal.         Judgment: Judgment normal.       Results for orders placed or performed in visit on 02/20/24   POC Glycosylated Hemoglobin (Hb A1C)    Specimen: Blood   Result Value Ref Range    Hemoglobin " A1C 7.9 (A) 4.5 - 5.7 %    Lot Number [id number]     Expiration Date 11/21/2025    POC Glucose, Blood    Specimen: Blood   Result Value Ref Range    Glucose 175 (A) 70 - 130 mg/dL    Lot Number [id number]     Expiration Date 08/24/2024      Diagnoses and all orders for this visit:    1. Type 1 diabetes mellitus without complication (Primary)  Assessment & Plan:  Blood sugar and 90 day average sugar reviewed  Results for orders placed or performed in visit on 02/20/24   POC Glycosylated Hemoglobin (Hb A1C)    Specimen: Blood   Result Value Ref Range    Hemoglobin A1C 7.9 (A) 4.5 - 5.7 %    Lot Number [id number]     Expiration Date 11/21/2025    POC Glucose, Blood    Specimen: Blood   Result Value Ref Range    Glucose 175 (A) 70 - 130 mg/dL    Lot Number [id number]     Expiration Date 08/24/2024      Higher average sugar - discussed using sensor to help keep sugars in goal   G7 sent and she will let us know if she cannot connect and use   Is utd with eye exam  No foot callus or ulcer  Ur alb neg  F/u 3-4 months     Orders:  -     POC Glycosylated Hemoglobin (Hb A1C)  -     POC Glucose, Blood  -     Comprehensive Metabolic Panel; Future  -     Microalbumin / Creatinine Urine Ratio - Urine, Clean Catch; Future  -     Selenium Serum; Future  -     Comprehensive Metabolic Panel  -     Microalbumin / Creatinine Urine Ratio - Urine, Clean Catch  -     Selenium Serum    2. Acquired hypothyroidism  Assessment & Plan:  Is taking thyroid 60 mg daily   Check tfts     Orders:  -     TSH; Future  -     T4, Free; Future  -     TSH  -     T4, Free    3. Pure hypercholesterolemia  Assessment & Plan:  Eating low fat diet and taking lipitor 10 mg daily   Check flp     Orders:  -     Lipid Panel; Future  -     Lipid Panel    4. Vitamin D deficiency  -     Vitamin D,25-Hydroxy; Future  -     Vitamin D,25-Hydroxy    Other orders  -     Insulin Degludec (Tresiba) 100 UNIT/ML solution injection; INject 13U subQ once per day  Dispense: 30 mL;  "Refill: 1  -     Thyroid 60 MG PO tablet; Take 1 tablet by mouth Daily.  Dispense: 90 tablet; Refill: 1  -     Insulin Syringe-Needle U-100 (BD Insulin Syringe U/F) 31G X 5/16\" 0.5 ML misc; Use 4 per day to administer insulin  Dispense: 400 each; Refill: 1  -     Continuous Blood Gluc Sensor (Dexcom G7 Sensor) misc; Use 1 Units Every 10 (Ten) Days.  Dispense: 3 each; Refill: 5    Return in about 3 months (around 5/20/2024) for Recheck.    Mar Watkins MD  Signed Mar Watkins MD      "

## 2024-02-22 LAB — SELENIUM SERPL-MCNC: 145 UG/L (ref 93–198)

## 2024-04-19 ENCOUNTER — TELEPHONE (OUTPATIENT)
Dept: ENDOCRINOLOGY | Facility: CLINIC | Age: 49
End: 2024-04-19
Payer: COMMERCIAL

## 2024-04-19 NOTE — TELEPHONE ENCOUNTER
Pt needs refill for insulin    Patient tried samples of Fiasp, would like to get this instead of the NovoLOG if possible    If not, can send the NovoLOG    Needs Prior auth send to insurance      To send to:[id number] - [place], [place] - [address] AT [facility] [address] - [phone]  - [phone] FX

## 2024-04-22 RX ORDER — INSULIN ASPART INJECTION 100 [IU]/ML
10 INJECTION, SOLUTION SUBCUTANEOUS 3 TIMES DAILY
Qty: 15 ML | Refills: 3 | Status: SHIPPED | OUTPATIENT
Start: 2024-04-22 | End: 2024-04-22

## 2024-04-22 RX ORDER — INSULIN LISPRO-AABC 100 [IU]/ML
10 INJECTION, SOLUTION SUBCUTANEOUS
Qty: 90 ML | Refills: 1 | Status: SHIPPED | OUTPATIENT
Start: 2024-04-22 | End: 2024-04-23 | Stop reason: ALTCHOICE

## 2024-04-22 NOTE — TELEPHONE ENCOUNTER
Fiasp is not on formulary- please let her know I sent lyumjev instead (comparable formulary item)  Thanks,   Pedro Watkins MD

## 2024-04-23 RX ORDER — INSULIN LISPRO-AABC 100 [IU]/ML
INJECTION, SOLUTION INTRAVENOUS; SUBCUTANEOUS
Qty: 10 ML | Refills: 3 | Status: SHIPPED | OUTPATIENT
Start: 2024-04-23

## 2024-04-23 NOTE — TELEPHONE ENCOUNTER
Patient returned Zina's call. She said she is okay with what Dr. Watkins sent in, but she is needing a vial and thinks her insurance will cover it. She does not want a pen. Please advise.